# Patient Record
Sex: FEMALE | URBAN - METROPOLITAN AREA
[De-identification: names, ages, dates, MRNs, and addresses within clinical notes are randomized per-mention and may not be internally consistent; named-entity substitution may affect disease eponyms.]

---

## 2022-09-30 ENCOUNTER — APPOINTMENT (OUTPATIENT)
Dept: URBAN - METROPOLITAN AREA CLINIC 260 | Age: 62
Setting detail: DERMATOLOGY
End: 2022-10-01

## 2022-09-30 ENCOUNTER — RX ONLY (RX ONLY)
Age: 62
End: 2022-09-30

## 2022-09-30 VITALS — WEIGHT: 135 LBS | HEIGHT: 69 IN

## 2022-09-30 DIAGNOSIS — L60.8 OTHER NAIL DISORDERS: ICD-10-CM

## 2022-09-30 DIAGNOSIS — L20.89 OTHER ATOPIC DERMATITIS: ICD-10-CM

## 2022-09-30 PROBLEM — L20.84 INTRINSIC (ALLERGIC) ECZEMA: Status: ACTIVE | Noted: 2022-09-30

## 2022-09-30 PROCEDURE — 99203 OFFICE O/P NEW LOW 30 MIN: CPT

## 2022-09-30 PROCEDURE — OTHER COUNSELING: OTHER

## 2022-09-30 PROCEDURE — OTHER PRESCRIPTION: OTHER

## 2022-09-30 PROCEDURE — OTHER PHOTO-DOCUMENTATION: OTHER

## 2022-09-30 PROCEDURE — OTHER ADDITIONAL NOTES: OTHER

## 2022-09-30 PROCEDURE — OTHER MIPS QUALITY: OTHER

## 2022-09-30 PROCEDURE — OTHER PRESCRIPTION MEDICATION MANAGEMENT: OTHER

## 2022-09-30 RX ORDER — FLUOCINOLONE ACETONIDE 0.11 MG/ML
0.01% OIL AURICULAR (OTIC) BID
Qty: 20 | Refills: 6 | Status: ERX

## 2022-09-30 RX ORDER — CLOBETASOL PROPIONATE 0.5 MG/ML
0.05% SOLUTION TOPICAL BID
Qty: 50 | Refills: 6 | Status: ERX | COMMUNITY
Start: 2022-09-30

## 2022-09-30 RX ORDER — CLOBETASOL PROPIONATE 0.5 MG/ML
0.05% SOLUTION TOPICAL BID
Qty: 50 | Refills: 6 | Status: ERX

## 2022-09-30 RX ORDER — FLUOCINOLONE ACETONIDE 0.11 MG/ML
0.01% OIL AURICULAR (OTIC) BID
Qty: 20 | Refills: 6 | Status: ERX | COMMUNITY
Start: 2022-09-30

## 2022-09-30 ASSESSMENT — LOCATION DETAILED DESCRIPTION DERM
LOCATION DETAILED: LEFT SUPERIOR MEDIAL FOREHEAD
LOCATION DETAILED: LEFT SMALL FINGERNAIL
LOCATION DETAILED: LEFT RING FINGERNAIL
LOCATION DETAILED: RIGHT MEDIAL FRONTAL SCALP

## 2022-09-30 ASSESSMENT — LOCATION SIMPLE DESCRIPTION DERM
LOCATION SIMPLE: LEFT RING FINGERNAIL
LOCATION SIMPLE: LEFT FOREHEAD
LOCATION SIMPLE: RIGHT SCALP
LOCATION SIMPLE: LEFT SMALL FINGERNAIL

## 2022-09-30 ASSESSMENT — LOCATION ZONE DERM
LOCATION ZONE: SCALP
LOCATION ZONE: FINGERNAIL
LOCATION ZONE: FACE

## 2022-09-30 NOTE — PROCEDURE: PRESCRIPTION MEDICATION MANAGEMENT
Detail Level: Zone
Continue Regimen: clobetasol 0.05 % scalp solution BID\\nfluocinolone acetonide oil 0.01 % ear drops Bid
Plan: Recheck eczema as needed.\\nPt just needs refills today. These control her symptoms well
Render In Strict Bullet Format?: No

## 2022-09-30 NOTE — PROCEDURE: ADDITIONAL NOTES
Additional Notes: Advised patient to try over the counter tea tree oil applied to nails qd to prophylactically treat for her fungus however I do not see any present today. Photos taken.
Detail Level: Detailed
Render Risk Assessment In Note?: no

## 2023-01-17 ENCOUNTER — RX ONLY (RX ONLY)
Age: 63
End: 2023-01-17

## 2023-01-17 RX ORDER — BETAMETHASONE DIPROPIONATE 0.5 MG/ML
LOTION TOPICAL
Qty: 60 | Refills: 2 | Status: ERX | COMMUNITY
Start: 2023-01-17

## 2023-03-24 ENCOUNTER — RX ONLY (RX ONLY)
Age: 63
End: 2023-03-24

## 2023-03-24 RX ORDER — CLOBETASOL PROPIONATE 0.5 MG/ML
0.05% SOLUTION TOPICAL BID
Qty: 50 | Refills: 6 | Status: ERX

## 2023-11-29 ENCOUNTER — HOSPITAL ENCOUNTER (INPATIENT)
Facility: CLINIC | Age: 63
LOS: 3 days | Discharge: HOME | DRG: 641 | End: 2023-12-02
Attending: STUDENT IN AN ORGANIZED HEALTH CARE EDUCATION/TRAINING PROGRAM | Admitting: STUDENT IN AN ORGANIZED HEALTH CARE EDUCATION/TRAINING PROGRAM
Payer: COMMERCIAL

## 2023-11-29 DIAGNOSIS — E87.1 HYPONATREMIA: Primary | ICD-10-CM

## 2023-11-29 DIAGNOSIS — E83.51 HYPOCALCEMIA: ICD-10-CM

## 2023-11-29 LAB
ANION GAP SERPL CALCULATED.3IONS-SCNC: 10 MMOL/L (ref 7–15)
ANION GAP SERPL CALCULATED.3IONS-SCNC: 9 MMOL/L (ref 7–15)
BASOPHILS # BLD AUTO: 0 10E3/UL (ref 0–0.2)
BASOPHILS NFR BLD AUTO: 0 %
BUN SERPL-MCNC: 6.1 MG/DL (ref 8–23)
BUN SERPL-MCNC: 7.5 MG/DL (ref 8–23)
CALCIUM SERPL-MCNC: 8.6 MG/DL (ref 8.8–10.2)
CALCIUM SERPL-MCNC: 8.7 MG/DL (ref 8.8–10.2)
CHLORIDE SERPL-SCNC: 87 MMOL/L (ref 98–107)
CHLORIDE SERPL-SCNC: 89 MMOL/L (ref 98–107)
CREAT SERPL-MCNC: 0.74 MG/DL (ref 0.51–0.95)
CREAT SERPL-MCNC: 0.74 MG/DL (ref 0.51–0.95)
DEPRECATED HCO3 PLAS-SCNC: 22 MMOL/L (ref 22–29)
DEPRECATED HCO3 PLAS-SCNC: 23 MMOL/L (ref 22–29)
EGFRCR SERPLBLD CKD-EPI 2021: >90 ML/MIN/1.73M2
EGFRCR SERPLBLD CKD-EPI 2021: >90 ML/MIN/1.73M2
EOSINOPHIL # BLD AUTO: 0 10E3/UL (ref 0–0.7)
EOSINOPHIL NFR BLD AUTO: 0 %
ERYTHROCYTE [DISTWIDTH] IN BLOOD BY AUTOMATED COUNT: 12 % (ref 10–15)
GLUCOSE SERPL-MCNC: 125 MG/DL (ref 70–99)
GLUCOSE SERPL-MCNC: 133 MG/DL (ref 70–99)
HCT VFR BLD AUTO: 38.6 % (ref 35–47)
HGB BLD-MCNC: 13.5 G/DL (ref 11.7–15.7)
HOLD SPECIMEN: NORMAL
IMM GRANULOCYTES # BLD: 0 10E3/UL
IMM GRANULOCYTES NFR BLD: 0 %
LYMPHOCYTES # BLD AUTO: 0.9 10E3/UL (ref 0.8–5.3)
LYMPHOCYTES NFR BLD AUTO: 19 %
MAGNESIUM SERPL-MCNC: 1.8 MG/DL (ref 1.7–2.3)
MCH RBC QN AUTO: 31.1 PG (ref 26.5–33)
MCHC RBC AUTO-ENTMCNC: 35 G/DL (ref 31.5–36.5)
MCV RBC AUTO: 89 FL (ref 78–100)
MONOCYTES # BLD AUTO: 0.4 10E3/UL (ref 0–1.3)
MONOCYTES NFR BLD AUTO: 8 %
NEUTROPHILS # BLD AUTO: 3.6 10E3/UL (ref 1.6–8.3)
NEUTROPHILS NFR BLD AUTO: 73 %
NRBC # BLD AUTO: 0 10E3/UL
NRBC BLD AUTO-RTO: 0 /100
OSMOLALITY SERPL: 259 MMOL/KG (ref 280–301)
PLATELET # BLD AUTO: 310 10E3/UL (ref 150–450)
POTASSIUM SERPL-SCNC: 4.7 MMOL/L (ref 3.4–5.3)
POTASSIUM SERPL-SCNC: 4.8 MMOL/L (ref 3.4–5.3)
RBC # BLD AUTO: 4.34 10E6/UL (ref 3.8–5.2)
SODIUM SERPL-SCNC: 119 MMOL/L (ref 135–145)
SODIUM SERPL-SCNC: 121 MMOL/L (ref 135–145)
SODIUM SERPL-SCNC: 123 MMOL/L (ref 135–145)
SODIUM SERPL-SCNC: 127 MMOL/L (ref 135–145)
SODIUM SERPL-SCNC: 128 MMOL/L (ref 135–145)
SODIUM SERPL-SCNC: 128 MMOL/L (ref 135–145)
SODIUM UR-SCNC: 89 MMOL/L
WBC # BLD AUTO: 5 10E3/UL (ref 4–11)

## 2023-11-29 PROCEDURE — 84300 ASSAY OF URINE SODIUM: CPT | Performed by: STUDENT IN AN ORGANIZED HEALTH CARE EDUCATION/TRAINING PROGRAM

## 2023-11-29 PROCEDURE — 36415 COLL VENOUS BLD VENIPUNCTURE: CPT | Performed by: STUDENT IN AN ORGANIZED HEALTH CARE EDUCATION/TRAINING PROGRAM

## 2023-11-29 PROCEDURE — 250N000011 HC RX IP 250 OP 636: Performed by: STUDENT IN AN ORGANIZED HEALTH CARE EDUCATION/TRAINING PROGRAM

## 2023-11-29 PROCEDURE — 84295 ASSAY OF SERUM SODIUM: CPT | Performed by: STUDENT IN AN ORGANIZED HEALTH CARE EDUCATION/TRAINING PROGRAM

## 2023-11-29 PROCEDURE — 99223 1ST HOSP IP/OBS HIGH 75: CPT | Performed by: NURSE PRACTITIONER

## 2023-11-29 PROCEDURE — 83735 ASSAY OF MAGNESIUM: CPT | Performed by: STUDENT IN AN ORGANIZED HEALTH CARE EDUCATION/TRAINING PROGRAM

## 2023-11-29 PROCEDURE — 99285 EMERGENCY DEPT VISIT HI MDM: CPT | Mod: 25

## 2023-11-29 PROCEDURE — 120N000001 HC R&B MED SURG/OB

## 2023-11-29 PROCEDURE — 258N000003 HC RX IP 258 OP 636: Performed by: STUDENT IN AN ORGANIZED HEALTH CARE EDUCATION/TRAINING PROGRAM

## 2023-11-29 PROCEDURE — 258N000003 HC RX IP 258 OP 636: Performed by: HOSPITALIST

## 2023-11-29 PROCEDURE — 250N000011 HC RX IP 250 OP 636: Mod: JZ | Performed by: STUDENT IN AN ORGANIZED HEALTH CARE EDUCATION/TRAINING PROGRAM

## 2023-11-29 PROCEDURE — 85025 COMPLETE CBC W/AUTO DIFF WBC: CPT | Performed by: STUDENT IN AN ORGANIZED HEALTH CARE EDUCATION/TRAINING PROGRAM

## 2023-11-29 PROCEDURE — 96360 HYDRATION IV INFUSION INIT: CPT

## 2023-11-29 PROCEDURE — 80051 ELECTROLYTE PANEL: CPT | Performed by: STUDENT IN AN ORGANIZED HEALTH CARE EDUCATION/TRAINING PROGRAM

## 2023-11-29 PROCEDURE — 83930 ASSAY OF BLOOD OSMOLALITY: CPT | Performed by: STUDENT IN AN ORGANIZED HEALTH CARE EDUCATION/TRAINING PROGRAM

## 2023-11-29 RX ORDER — ONDANSETRON 2 MG/ML
4 INJECTION INTRAMUSCULAR; INTRAVENOUS
Status: COMPLETED | OUTPATIENT
Start: 2023-11-29 | End: 2023-11-29

## 2023-11-29 RX ORDER — LIDOCAINE 40 MG/G
CREAM TOPICAL
Status: DISCONTINUED | OUTPATIENT
Start: 2023-11-29 | End: 2023-12-02 | Stop reason: HOSPADM

## 2023-11-29 RX ORDER — ACETAMINOPHEN 650 MG/1
650 SUPPOSITORY RECTAL EVERY 4 HOURS PRN
Status: DISCONTINUED | OUTPATIENT
Start: 2023-11-29 | End: 2023-11-29

## 2023-11-29 RX ORDER — DEXTROSE MONOHYDRATE 50 MG/ML
INJECTION, SOLUTION INTRAVENOUS CONTINUOUS
Status: DISCONTINUED | OUTPATIENT
Start: 2023-11-29 | End: 2023-11-30

## 2023-11-29 RX ORDER — AMOXICILLIN 250 MG
2 CAPSULE ORAL 2 TIMES DAILY PRN
Status: DISCONTINUED | OUTPATIENT
Start: 2023-11-29 | End: 2023-12-02 | Stop reason: HOSPADM

## 2023-11-29 RX ORDER — IBUPROFEN 400 MG/1
400 TABLET, FILM COATED ORAL EVERY 6 HOURS PRN
Status: DISCONTINUED | OUTPATIENT
Start: 2023-11-29 | End: 2023-12-01

## 2023-11-29 RX ORDER — AMOXICILLIN 250 MG
1 CAPSULE ORAL 2 TIMES DAILY PRN
Status: DISCONTINUED | OUTPATIENT
Start: 2023-11-29 | End: 2023-12-02 | Stop reason: HOSPADM

## 2023-11-29 RX ORDER — SPIRONOLACTONE 50 MG/1
50 TABLET, FILM COATED ORAL DAILY
Status: ON HOLD | COMMUNITY
End: 2023-12-02

## 2023-11-29 RX ORDER — ACETAMINOPHEN 325 MG/1
650 TABLET ORAL EVERY 4 HOURS PRN
Status: DISCONTINUED | OUTPATIENT
Start: 2023-11-29 | End: 2023-11-29

## 2023-11-29 RX ORDER — CALCIUM CARBONATE 500 MG/1
1000 TABLET, CHEWABLE ORAL 4 TIMES DAILY PRN
Status: DISCONTINUED | OUTPATIENT
Start: 2023-11-29 | End: 2023-12-02 | Stop reason: HOSPADM

## 2023-11-29 RX ORDER — MINOXIDIL 2.5 MG/1
2.5 TABLET ORAL DAILY
Status: ON HOLD | COMMUNITY
End: 2023-12-02

## 2023-11-29 RX ORDER — SODIUM CHLORIDE 9 MG/ML
INJECTION, SOLUTION INTRAVENOUS CONTINUOUS
Status: DISCONTINUED | OUTPATIENT
Start: 2023-11-29 | End: 2023-12-01

## 2023-11-29 RX ADMIN — DEXTROSE MONOHYDRATE: 50 INJECTION, SOLUTION INTRAVENOUS at 23:02

## 2023-11-29 RX ADMIN — ONDANSETRON 4 MG: 2 INJECTION INTRAMUSCULAR; INTRAVENOUS at 08:45

## 2023-11-29 RX ADMIN — SODIUM CHLORIDE, POTASSIUM CHLORIDE, SODIUM LACTATE AND CALCIUM CHLORIDE 1000 ML: 600; 310; 30; 20 INJECTION, SOLUTION INTRAVENOUS at 06:50

## 2023-11-29 RX ADMIN — DEXTROSE MONOHYDRATE 500 ML: 50 INJECTION, SOLUTION INTRAVENOUS at 18:19

## 2023-11-29 RX ADMIN — SODIUM CHLORIDE: 9 INJECTION, SOLUTION INTRAVENOUS at 10:04

## 2023-11-29 ASSESSMENT — ACTIVITIES OF DAILY LIVING (ADL)
ADLS_ACUITY_SCORE: 22
ADLS_ACUITY_SCORE: 35
ADLS_ACUITY_SCORE: 22
ADLS_ACUITY_SCORE: 35
WEAR_GLASSES_OR_BLIND: YES
ADLS_ACUITY_SCORE: 22

## 2023-11-29 NOTE — ED TRIAGE NOTES
Pt states she Had covid on 11/21, took low dose paxlovid and felt like recovered nicely. Has been taking spironolactone since April for hair loss and recently started taking minoxadil as well which was added recently. Pt is aware that both of these are dieretics and feels that she is dehydrated and/or electrolytes are off despite trying to stay extra hydrated. Pt feels light headed, has brain fog, nausea, and like she may pass out. Pt endorses having diarrhea and lingering cough. Has hx of hypoglycemia, though has never been tested for it. VSS, AxOx4

## 2023-11-29 NOTE — ED PROVIDER NOTES
"  History     Chief Complaint:  Dehydration       HPI   Kylee Shin is a 63 year old female presents with concerns of electrolyte issues.  She states she has been having all the symptoms of having electrolyte issues and dehydration for the last few days including feeling lightheaded, brain fog and nausea.  Patient recently started spironolactone in April for hair loss.  Then last week she developed COVID symptoms and started Paxlovid on Wednesday.  She completed the course.  She has been drinking a lot of water up through last night.  Patient also endorses having diarrhea and a lingering cough.  No chest pain.  No shortness of breath.  No difficulties ambulating.      Independent Historian:    none    Review of External Notes:  Office visit November 24, 2023 for Paxlovid.    Allergies:  No Known Drug Allergy     Physical Exam   Patient Vitals for the past 24 hrs:   BP Temp Temp src Pulse Resp SpO2 Height Weight   11/29/23 0751 (!) 155/94 -- -- 92 -- 100 % -- --   11/29/23 0615 -- 97.9  F (36.6  C) Oral -- -- -- 1.753 m (5' 9\") 62.1 kg (137 lb)   11/29/23 0614 (!) 157/107 -- -- 88 16 100 % -- --        Physical Exam  GENERAL: Patient appears fatigued but nontoxic.  HEAD: Atraumatic.  NECK: No rigidity  CV: RRR, no murmurs rubs or gallops  PULM: CTAB with good aeration; no retractions, rales, rhonchi, or wheezing  ABD: Soft, nontender, nondistended, no guarding  DERM: No rash. Skin warm and dry  EXTREMITY: Moving all extremities without difficulty. No calf tenderness or peripheral edema      Emergency Department Course         Laboratory: Imaging:   Labs Ordered and Resulted from Time of ED Arrival to Time of ED Departure   BASIC METABOLIC PANEL - Abnormal       Result Value    Sodium 119 (*)     Potassium 4.7      Chloride 87 (*)     Carbon Dioxide (CO2) 22      Anion Gap 10      Urea Nitrogen 7.5 (*)     Creatinine 0.74      GFR Estimate >90      Calcium 8.7 (*)     Glucose 133 (*)    MAGNESIUM - Normal    " Magnesium 1.8     CBC WITH PLATELETS AND DIFFERENTIAL    WBC Count 5.0      RBC Count 4.34      Hemoglobin 13.5      Hematocrit 38.6      MCV 89      MCH 31.1      MCHC 35.0      RDW 12.0      Platelet Count 310      % Neutrophils 73      % Lymphocytes 19      % Monocytes 8      % Eosinophils 0      % Basophils 0      % Immature Granulocytes 0      NRBCs per 100 WBC 0      Absolute Neutrophils 3.6      Absolute Lymphocytes 0.9      Absolute Monocytes 0.4      Absolute Eosinophils 0.0      Absolute Basophils 0.0      Absolute Immature Granulocytes 0.0      Absolute NRBCs 0.0     BASIC METABOLIC PANEL     No orders to display                Emergency Department Course & Assessments:             Interventions:  Medications   ondansetron (ZOFRAN) injection 4 mg (has no administration in time range)   lactated ringers BOLUS 1,000 mL (0 mLs Intravenous Stopped 11/29/23 0418)        Assessments, Independent Interpretation, Consult/Discussion of ManagementTests:   Discussed with hospitalist BETTY Winchester under Dr. Birch    Social Determinants of Health affecting care:  None    Disposition:  The patient was admitted to the hospital under the care of Dr. Birch.     Impression & Plan         Medical Decision Making:  Symptoms consistent with hyponatremia  Chronic conditions complicating -hair loss, on spironolactone.  DDx considered hypervolemic vs hypovolemic vs euvolemic causes.  Patient was initially given a liter of fluids upfront as she felt dehydrated.  We will give no further IV fluids and will p.o. restrict.  Labs significant for hyponatremia  Patient is answering all questions appropriately.  She does not appear confused.  No seizure.  Consider hypertonic saline -however not emergently indicated.  PO restricted patient  Discussed with hospitalist team  Patient admitted.   Repeat sodium pending upon admission.      Diagnosis:    ICD-10-CM    1. Hyponatremia  E87.1       2. Hypocalcemia  E83.51            Discharge  Medications:  New Prescriptions    No medications on file          11/29/2023   Jason Beck MD Foss, Kevin, MD  11/29/23 0844

## 2023-11-29 NOTE — PHARMACY-ADMISSION MEDICATION HISTORY
Pharmacist Admission Medication History    Admission medication history is complete. The information provided in this note is only as accurate as the sources available at the time of the update.    Information Source(s): Patient via in-person    Pertinent Information: Completed a 5 days course of Paxlovid, last dose taken yesterday at 9am. Takes THC gummies for sleep sometimes. Omeprazole - does not take on regular basis, about two pills per month.    Changes made to PTA medication list:  Added: all  Deleted: augmentin (old script, does not take)  Changed: None    Medication Affordability: no     Allergies reviewed with patient and updates made in EHR: yes (nka)    Prior to Admission medications    Medication Sig Last Dose Taking?   minoxidil (LONITEN) 2.5 MG tablet Take 2.5 mg by mouth daily 11/28/2023 Yes   omeprazole (PRILOSEC) 20 MG DR capsule Take 20 mg by mouth daily as needed (reflux) prn Yes   spironolactone (ALDACTONE) 50 MG tablet Take 50 mg by mouth daily 11/28/2023 Yes        100

## 2023-11-29 NOTE — H&P
[facility]  Admission History and Physical Examination    NAME: Kylee Draper   : 1960   MRN: [id number]     Date of Admission:  2023    Assessment & Plan   Kylee Draper is a 63 year old female who presents with confusion, weakness, dizziness due to acute hyponataremia and recent COVID infection.     Acute Symptomatic Hyponatremia  Confusion  Nausea  Sodium is 119, no prior episodes of hyponatremia so this is acute. Presents with 1 week of worsening dizziness, weakness, diarrhea and now confusion. Did have COVID within the past week, was treated with Paxlovid. BMP shows acute hyponatremia. Is on Spirolactone so this combine with recent COVID infection most likely is cause of hyponatremia.   - Q4 Sodium  - Q4 Neuro checks  - Goal is to have sodium  increase 6-8 today, avoid going past 127 today.  If goes higher than 127, need to start D5.   - Gentle IV with normal saline  - hold diuretic  - urine studies, Osmolality   - PRN nausea    Recent COVID infection  - Tested positive on , symptoms started on , should be out of window for precautions  - Treated with Paxlovid at home  - no O2 needs    Weakness  - PT/OT    Hair loss  - Was taking Spirolactone, currently will hold due to dehydration    Insomnia  - Hold Modafinil      Awaiting formal pharmacy reconciliation to resume home medications.     DVT Prophylaxis: Low Risk/Ambulatory with no VTE prophylaxis indicated  Code Status: Full Code  COVID PCR TESTING STATUS: NA, tested positive for COVID on   Family updated with plan of care: Family at bedside       Expected Discharge Date: 2023               HERB Waters CNP    Primary Care Physician   Physician No Ref-Primary    Chief Complaint   Weakness, Confusion, nasuea, Diarrhea    History is obtained from the patient    Discussed with Dr. Beck in the ED, full chart review including lab work, imaging, and vital signs were reviewed. Patient received 1L IV fluids  in the ED, recheck in [facility] is pending      History of Present Illness   Kylee Draper is a 63 year old female who presents with insomnia and hair loss on Spirolactone who presented to ED with with 1 weak of weakness, dizziness, confusion, and diarrhea. Per patient, she developed COVID symptoms and within 1-2 days, she test positive for COVID on 11/21. She was not improving so she was prescribed Paxlovid. Initially she could not tolerate due to GI side effects so she cut dose in half to complete course. Over this time, she has felt continuous weakness, intermittent lightheadedness, dizziness, and malaise. Yesterday she started to notice her PO intake was poor and she developed confusion so she presented to the ED for evaluation.  In the ED, she was found to have sodium of 119 and was given 1L of IV fluids. Currently is feeling nauseated, was given Zofran. She was also noted to start Modafinil within the past 3 weeks for Insomnia.       Past Medical History    I have reviewed this patient's medical history and updated it with pertinent information if needed.   Past Medical History:   Diagnosis Date    Basal cell carcinoma     Diverticulosis of large intestine     Squamous cell carcinoma        Past Surgical History   I have reviewed this patient's surgical history and updated it with pertinent information if needed.  Past Surgical History:   Procedure Laterality Date    BIOPSY OF SKIN LESION      CHOLECYSTECTOMY      COLONOSCOPY      MOHS MICROGRAPHIC PROCEDURE         Prior to Admission Medications   Prior to Admission Medications   Prescriptions Last Dose Informant Patient Reported? Taking?   amoxicillin-clavulanate (AUGMENTIN) 875-125 MG per tablet   Yes No   Sig: Take 1 tablet by mouth 2 times daily      Facility-Administered Medications: None     Allergies   Allergies   Allergen Reactions    No Known Drug Allergy        Social History   I have reviewed this patient's social history and updated it with pertinent  information if needed. Kylee Draper  reports that she has never smoked. She has never used smokeless tobacco.    Family History   I have reviewed this patient's family history and updated it with pertinent information if needed.   Family History   Problem Relation Age of Onset    Cancer No family hx of         No known family hx of skin cancer       Review of Systems   The 10 point Review of Systems is negative other than noted in the HPI or here.     Physical Exam   Temp: 97.9  F (36.6  C) Temp src: Oral BP: (!) 155/94 Pulse: 92   Resp: 16 SpO2: 100 %      Vital Signs with Ranges  Temp:  [97.9  F (36.6  C)] 97.9  F (36.6  C)  Pulse:  [88-92] 92  Resp:  [16] 16  BP: (155-157)/() 155/94  SpO2:  [100 %] 100 %  137 lbs 0 oz    Constitutional: Awake, alert,  feels unwell, weak  Eyes: Conjunctiva and pupils examined and normal.  HEENT: Dry mucous membranes, normal dentition.  Respiratory: Clear to auscultation bilaterally, no crackles or wheezing. No respiratory distress  Cardiovascular: Regular rate and rhythm, normal S1 and S2, and no murmur noted. No edema  GI: Soft, non-distended, non-tender, bowel sounds present. No rebound tenderness or guarding.  Lymph/Hematologic: No anterior cervical or supraclavicular adenopathy.  Skin: No rashes, no cyanosis, no edema.  Musculoskeletal: No deformities noted.  No erythema or tenderness. Moving all extremities.  Neurologic: No focal deficits noted. Speech is clear. Coordination and strength grossly normal.  Alert and oriented.   Psychiatric: Appropriate affect.    Data   Data reviewed today:      EKG: None  Imaging: No results found for this or any previous visit (from the past 24 hour(s)).    Recent Labs   Lab 11/29/23  0648   WBC 5.0   HGB 13.5   MCV 89      *   POTASSIUM 4.7   CHLORIDE 87*   CO2 22   BUN 7.5*   CR 0.74   ANIONGAP 10   JORJE 8.7*   *           HERB Waters [facility] Medicine  November 29, 2023  Securely message with  the [employer] Web Console (learn more here)  Text page via [facility] Paging/Directory

## 2023-11-29 NOTE — PLAN OF CARE
"Goal Outcome Evaluation:      Plan of Care Reviewed With: patient    Overall Patient Progress: improvingOverall Patient Progress: improving    Outcome Evaluation: .    A&Ox4  VSS on RA  Complains of a headache, waiting for orders  Activity: SBA   Consults: PT/OT  Monitoring sodium level. Notify if greater than 127      Problem: Adult Inpatient Plan of Care  Goal: Plan of Care Review  Description: The Plan of Care Review/Shift note should be completed every shift.  The Outcome Evaluation is a brief statement about your assessment that the patient is improving, declining, or no change.  This information will be displayed automatically on your shift  note.  Outcome: Progressing  Flowsheets (Taken 11/29/2023 1510)  Outcome Evaluation: .  Plan of Care Reviewed With: patient  Overall Patient Progress: improving  Goal: Patient-Specific Goal (Individualized)  Description: You can add care plan individualizations to a care plan. Examples of Individualization might be:  \"Parent requests to be called daily at 9am for status\", \"I have a hard time hearing out of my right ear\", or \"Do not touch me to wake me up as it startles  me\".  Outcome: Progressing  Goal: Absence of Hospital-Acquired Illness or Injury  Outcome: Progressing  Intervention: Identify and Manage Fall Risk  Recent Flowsheet Documentation  Taken 11/29/2023 1443 by Dipika Rose RN  Safety Promotion/Fall Prevention:   activity supervised   assistive device/personal items within reach   clutter free environment maintained   increased rounding and observation   increase visualization of patient   lighting adjusted   nonskid shoes/slippers when out of bed   patient and family education   room door open   room near nurse's station   room organization consistent   safety round/check completed   supervised activity   treat underlying cause  Intervention: Prevent Skin Injury  Recent Flowsheet Documentation  Taken 11/29/2023 1443 by Dipika Rose, RN  Body " Position:   position changed independently   supine  Intervention: Prevent and Manage VTE (Venous Thromboembolism) Risk  Recent Flowsheet Documentation  Taken 11/29/2023 1443 by Dipika Rose, RN  VTE Prevention/Management: SCDs (sequential compression devices) off  Intervention: Prevent Infection  Recent Flowsheet Documentation  Taken 11/29/2023 1443 by Dipika Rose, RN  Infection Prevention:   equipment surfaces disinfected   hand hygiene promoted  Goal: Optimal Comfort and Wellbeing  Outcome: Progressing  Goal: Readiness for Transition of Care  Outcome: Progressing  Intervention: Mutually Develop Transition Plan  Recent Flowsheet Documentation  Taken 11/29/2023 1443 by Dipika Rose, RN  Equipment Currently Used at Home: none

## 2023-11-29 NOTE — PROGRESS NOTES
Initial sodium on presentation was 119.  Started on maintenance IV fluids with NS.  Subsequent every 4 sodiums were 121, then 123, then 128.  Ideally peak sodium over first 24 hours would have been 127, so administered 500 cc bolus of D5W.  Discussed with cross cover physician, will follow-up next NA check and administer additional D5W if necessary if sodium continuing to rise.    Plan  - 500 cc bolus D5W now  - Follow-up 4 hours sodium check, goal sodium 127 by tomorrow morning      Anthony Birch MD

## 2023-11-29 NOTE — ED NOTES
"[facility]  ED Nurse Handoff Report    ED Chief complaint: Dehydration  . ED Diagnosis:   Final diagnoses:   Hyponatremia   Hypocalcemia       Allergies:   Allergies   Allergen Reactions    No Known Drug Allergy        Code Status: Full Code    Activity level - Baseline/Home:  independent.  Activity Level - Current:   independent.   Lift room needed: No.   Bariatric: No   Needed: No   Isolation: No.   Infection: Not Applicable.     Respiratory status: Room air    Vital Signs (within 30 minutes):   Vitals:    11/29/23 0614 11/29/23 0615 11/29/23 0751   BP: (!) 157/107  (!) 155/94   Pulse: 88  92   Resp: 16     Temp:  97.9  F (36.6  C)    TempSrc:  Oral    SpO2: 100%  100%   Weight:  62.1 kg (137 lb)    Height:  1.753 m (5' 9\")        Cardiac Rhythm:  ,      Pain level:    Patient confused: No.   Patient Falls Risk: nonskid shoes/slippers when out of bed.   Elimination Status: Has voided     Patient Report - Initial Complaint: Dehydration  Focused Assessment: Kylee Shin is a 63 year old female presents with concerns of electrolyte issues.  She states she has been having all the symptoms of having electrolyte issues and dehydration for the last few days including feeling lightheaded, brain fog and nausea.  Patient recently started spironolactone in April for hair loss.  Then last week she developed COVID symptoms and started Paxlovid on Wednesday.  She completed the course.  She has been drinking a lot of water up through last night.  Patient also endorses having diarrhea and a lingering cough.  No chest pain.  No shortness of breath.  No difficulties ambulating     Abnormal Results:   Labs Ordered and Resulted from Time of ED Arrival to Time of ED Departure   BASIC METABOLIC PANEL - Abnormal       Result Value    Sodium 119 (*)     Potassium 4.7      Chloride 87 (*)     Carbon Dioxide (CO2) 22      Anion Gap 10      Urea Nitrogen 7.5 (*)     Creatinine 0.74      GFR Estimate " >90      Calcium 8.7 (*)     Glucose 133 (*)    MAGNESIUM - Normal    Magnesium 1.8     CBC WITH PLATELETS AND DIFFERENTIAL    WBC Count 5.0      RBC Count 4.34      Hemoglobin 13.5      Hematocrit 38.6      MCV 89      MCH 31.1      MCHC 35.0      RDW 12.0      Platelet Count 310      % Neutrophils 73      % Lymphocytes 19      % Monocytes 8      % Eosinophils 0      % Basophils 0      % Immature Granulocytes 0      NRBCs per 100 WBC 0      Absolute Neutrophils 3.6      Absolute Lymphocytes 0.9      Absolute Monocytes 0.4      Absolute Eosinophils 0.0      Absolute Basophils 0.0      Absolute Immature Granulocytes 0.0      Absolute NRBCs 0.0          No orders to display       Treatments provided: IVF  Family Comments: Friend at bedside.   OBS brochure/video discussed/provided to patient:  N/A  ED Medications:   Medications   lactated ringers BOLUS 1,000 mL (0 mLs Intravenous Stopped 11/29/23 0751)       Drips infusing:  No  For the majority of the shift this patient was Green.   Interventions performed were N/A.    Sepsis treatment initiated: No    Cares/treatment/interventions/medications to be completed following ED care: Monitor NA levels. Anti-emetics.     ED Nurse Name: Tere Lares RN  8:00 AM   RECEIVING UNIT ED HANDOFF REVIEW    Above ED Nurse Handoff Report was reviewed: Yes  Reviewed by: Dipika Rose RN on November 29, 2023 at 2:06 PM

## 2023-11-30 ENCOUNTER — APPOINTMENT (OUTPATIENT)
Dept: PHYSICAL THERAPY | Facility: CLINIC | Age: 63
DRG: 641 | End: 2023-11-30
Attending: NURSE PRACTITIONER
Payer: COMMERCIAL

## 2023-11-30 LAB
ALBUMIN UR-MCNC: NEGATIVE MG/DL
ANION GAP SERPL CALCULATED.3IONS-SCNC: 10 MMOL/L (ref 7–15)
APPEARANCE UR: CLEAR
BASOPHILS # BLD AUTO: 0 10E3/UL (ref 0–0.2)
BASOPHILS NFR BLD AUTO: 1 %
BILIRUB UR QL STRIP: NEGATIVE
BUN SERPL-MCNC: 7.8 MG/DL (ref 8–23)
CALCIUM SERPL-MCNC: 8.3 MG/DL (ref 8.8–10.2)
CHLORIDE SERPL-SCNC: 92 MMOL/L (ref 98–107)
COLOR UR AUTO: NORMAL
CREAT SERPL-MCNC: 0.79 MG/DL (ref 0.51–0.95)
DEPRECATED HCO3 PLAS-SCNC: 24 MMOL/L (ref 22–29)
EGFRCR SERPLBLD CKD-EPI 2021: 84 ML/MIN/1.73M2
EOSINOPHIL # BLD AUTO: 0.1 10E3/UL (ref 0–0.7)
EOSINOPHIL NFR BLD AUTO: 2 %
ERYTHROCYTE [DISTWIDTH] IN BLOOD BY AUTOMATED COUNT: 12.3 % (ref 10–15)
GLUCOSE SERPL-MCNC: 107 MG/DL (ref 70–99)
GLUCOSE UR STRIP-MCNC: NEGATIVE MG/DL
HCT VFR BLD AUTO: 37.1 % (ref 35–47)
HGB BLD-MCNC: 12.6 G/DL (ref 11.7–15.7)
HGB UR QL STRIP: NEGATIVE
IMM GRANULOCYTES # BLD: 0 10E3/UL
IMM GRANULOCYTES NFR BLD: 1 %
KETONES UR STRIP-MCNC: NEGATIVE MG/DL
LEUKOCYTE ESTERASE UR QL STRIP: NEGATIVE
LYMPHOCYTES # BLD AUTO: 1.5 10E3/UL (ref 0.8–5.3)
LYMPHOCYTES NFR BLD AUTO: 37 %
MAGNESIUM SERPL-MCNC: 2 MG/DL (ref 1.7–2.3)
MCH RBC QN AUTO: 31 PG (ref 26.5–33)
MCHC RBC AUTO-ENTMCNC: 34 G/DL (ref 31.5–36.5)
MCV RBC AUTO: 91 FL (ref 78–100)
MONOCYTES # BLD AUTO: 0.5 10E3/UL (ref 0–1.3)
MONOCYTES NFR BLD AUTO: 13 %
NEUTROPHILS # BLD AUTO: 1.9 10E3/UL (ref 1.6–8.3)
NEUTROPHILS NFR BLD AUTO: 46 %
NITRATE UR QL: NEGATIVE
OSMOLALITY UR: 186 MMOL/KG (ref 100–1200)
PH UR STRIP: 7 [PH] (ref 5–7)
PLATELET # BLD AUTO: 293 10E3/UL (ref 150–450)
POTASSIUM SERPL-SCNC: 4 MMOL/L (ref 3.4–5.3)
RBC # BLD AUTO: 4.07 10E6/UL (ref 3.8–5.2)
RBC URINE: <1 /HPF
SODIUM SERPL-SCNC: 126 MMOL/L (ref 135–145)
SODIUM SERPL-SCNC: 127 MMOL/L (ref 135–145)
SODIUM SERPL-SCNC: 127 MMOL/L (ref 135–145)
SODIUM UR-SCNC: 56 MMOL/L
SP GR UR STRIP: 1 (ref 1–1.03)
TSH SERPL DL<=0.005 MIU/L-ACNC: 1.49 UIU/ML (ref 0.3–4.2)
UROBILINOGEN UR STRIP-MCNC: NORMAL MG/DL
WBC # BLD AUTO: 3.9 10E3/UL (ref 4–11)
WBC # BLD AUTO: 3.9 10E3/UL (ref 4–11)
WBC URINE: <1 /HPF

## 2023-11-30 PROCEDURE — 84295 ASSAY OF SERUM SODIUM: CPT | Performed by: STUDENT IN AN ORGANIZED HEALTH CARE EDUCATION/TRAINING PROGRAM

## 2023-11-30 PROCEDURE — 84443 ASSAY THYROID STIM HORMONE: CPT | Performed by: STUDENT IN AN ORGANIZED HEALTH CARE EDUCATION/TRAINING PROGRAM

## 2023-11-30 PROCEDURE — 83735 ASSAY OF MAGNESIUM: CPT | Performed by: STUDENT IN AN ORGANIZED HEALTH CARE EDUCATION/TRAINING PROGRAM

## 2023-11-30 PROCEDURE — 84300 ASSAY OF URINE SODIUM: CPT | Performed by: STUDENT IN AN ORGANIZED HEALTH CARE EDUCATION/TRAINING PROGRAM

## 2023-11-30 PROCEDURE — 999N000111 HC STATISTIC OT IP EVAL DEFER

## 2023-11-30 PROCEDURE — 99233 SBSQ HOSP IP/OBS HIGH 50: CPT | Performed by: STUDENT IN AN ORGANIZED HEALTH CARE EDUCATION/TRAINING PROGRAM

## 2023-11-30 PROCEDURE — 36415 COLL VENOUS BLD VENIPUNCTURE: CPT | Performed by: STUDENT IN AN ORGANIZED HEALTH CARE EDUCATION/TRAINING PROGRAM

## 2023-11-30 PROCEDURE — 85027 COMPLETE CBC AUTOMATED: CPT | Performed by: STUDENT IN AN ORGANIZED HEALTH CARE EDUCATION/TRAINING PROGRAM

## 2023-11-30 PROCEDURE — 83935 ASSAY OF URINE OSMOLALITY: CPT | Performed by: STUDENT IN AN ORGANIZED HEALTH CARE EDUCATION/TRAINING PROGRAM

## 2023-11-30 PROCEDURE — 258N000003 HC RX IP 258 OP 636: Performed by: HOSPITALIST

## 2023-11-30 PROCEDURE — 250N000013 HC RX MED GY IP 250 OP 250 PS 637: Performed by: STUDENT IN AN ORGANIZED HEALTH CARE EDUCATION/TRAINING PROGRAM

## 2023-11-30 PROCEDURE — 97161 PT EVAL LOW COMPLEX 20 MIN: CPT | Mod: GP | Performed by: PHYSICAL THERAPIST

## 2023-11-30 PROCEDURE — 99418 PROLNG IP/OBS E/M EA 15 MIN: CPT | Performed by: STUDENT IN AN ORGANIZED HEALTH CARE EDUCATION/TRAINING PROGRAM

## 2023-11-30 PROCEDURE — 120N000001 HC R&B MED SURG/OB

## 2023-11-30 PROCEDURE — 81001 URINALYSIS AUTO W/SCOPE: CPT | Performed by: STUDENT IN AN ORGANIZED HEALTH CARE EDUCATION/TRAINING PROGRAM

## 2023-11-30 PROCEDURE — 97530 THERAPEUTIC ACTIVITIES: CPT | Mod: GP | Performed by: PHYSICAL THERAPIST

## 2023-11-30 RX ORDER — GUAIFENESIN 200 MG/10ML
200 LIQUID ORAL EVERY 4 HOURS PRN
Status: DISCONTINUED | OUTPATIENT
Start: 2023-11-30 | End: 2023-12-02 | Stop reason: HOSPADM

## 2023-11-30 RX ADMIN — DEXTROSE MONOHYDRATE: 50 INJECTION, SOLUTION INTRAVENOUS at 05:29

## 2023-11-30 RX ADMIN — GUAIFENESIN 200 MG: 200 SOLUTION ORAL at 21:18

## 2023-11-30 ASSESSMENT — ACTIVITIES OF DAILY LIVING (ADL)
ADLS_ACUITY_SCORE: 22

## 2023-11-30 NOTE — PLAN OF CARE
Goal Outcome Evaluation:    A&Ox4. Q4 neuro checks. VSS on RA. Denies pain. Up SBA. Monitoring sodium level, with goal of 127.     2249: Sodium 128, MD paged--- NS stopped. D5 75ml/hr started.      Plan of Care Reviewed With: patient    Overall Patient Progress: improving

## 2023-11-30 NOTE — PLAN OF CARE
"Goal Outcome Evaluation:      Plan of Care Reviewed With: patient    Overall Patient Progress: improvingOverall Patient Progress: improving    Outcome Evaluation: .    A&Ox4  VSS on RA  Denies pain  Activity: Ind  Protocols: Mg and K+, rechecks in am. Monitor sodium  Consults: PT  Plan to discharge when sodium levels return to normal      Problem: Adult Inpatient Plan of Care  Goal: Plan of Care Review  Description: The Plan of Care Review/Shift note should be completed every shift.  The Outcome Evaluation is a brief statement about your assessment that the patient is improving, declining, or no change.  This information will be displayed automatically on your shift  note.  Outcome: Progressing  Flowsheets (Taken 11/30/2023 1520)  Outcome Evaluation: .  Plan of Care Reviewed With: patient  Overall Patient Progress: improving  Goal: Patient-Specific Goal (Individualized)  Description: You can add care plan individualizations to a care plan. Examples of Individualization might be:  \"Parent requests to be called daily at 9am for status\", \"I have a hard time hearing out of my right ear\", or \"Do not touch me to wake me up as it startles  me\".  Outcome: Progressing  Goal: Absence of Hospital-Acquired Illness or Injury  Outcome: Progressing  Intervention: Identify and Manage Fall Risk  Recent Flowsheet Documentation  Taken 11/30/2023 1006 by Dipika Rose, RN  Safety Promotion/Fall Prevention: safety round/check completed  Taken 11/30/2023 0753 by Dipika Rose, RN  Safety Promotion/Fall Prevention:   activity supervised   assistive device/personal items within reach   clutter free environment maintained   increased rounding and observation   increase visualization of patient   lighting adjusted   nonskid shoes/slippers when out of bed   patient and family education   room door open   room near nurse's station   room organization consistent   safety round/check completed   supervised activity   treat underlying " cause  Taken 11/30/2023 0705 by Dipika Rose, RN  Safety Promotion/Fall Prevention: safety round/check completed  Intervention: Prevent Skin Injury  Recent Flowsheet Documentation  Taken 11/30/2023 0753 by Dipika Rose RN  Body Position:   position changed independently   weight shifting  Intervention: Prevent and Manage VTE (Venous Thromboembolism) Risk  Recent Flowsheet Documentation  Taken 11/30/2023 0753 by Dipika Rose, RN  VTE Prevention/Management: SCDs (sequential compression devices) off  Intervention: Prevent Infection  Recent Flowsheet Documentation  Taken 11/30/2023 0753 by Dipika Rose, RN  Infection Prevention:   equipment surfaces disinfected   hand hygiene promoted  Goal: Optimal Comfort and Wellbeing  Outcome: Progressing  Goal: Readiness for Transition of Care  Outcome: Progressing     Problem: Electrolyte Imbalance  Goal: Electrolyte Balance  Outcome: Progressing

## 2023-11-30 NOTE — PROGRESS NOTES
11/30/23 0900   Signing Clinician's Name / Credentials   Signing clinician's name / credentials Ana Toth DPT   Dynamic Gait Index (Benny and Giordano Shiro, 1995)   Gait Level Surface 3   Change in Gait Speed 3   Gait and Horizontal Head Turns 2   Gait with Vertical Head Turns 2       4-Item Dynamic Gait Index: Is a measure of gait responses to changing task demands in people with balance and vestibular disorders. (gait on level, w/ lateral & vertical head turns, and w/ change of speed)    Gait assistive device used: NONE     Patient score: 10/12  Scores <9/12 are correlated with increased risk of falling according to Ifeanyi & Sallie 2006.     Assessment (rationale for performing, application to patient s function & care plan): Pt is scoring above a falls risk, but would recommend OP to improve to perfect score as endorses no balance issues before having COVID and sodium imbalance. Pt agreeable to recs.   Minutes billed as physical performance test: none part of eval

## 2023-11-30 NOTE — PROGRESS NOTES
"SPIRITUAL HEALTH SERVICES - Progress Note  [facility] Med/Surg Unit 3    Saw pt Kylee Draper per admission request. Pt's daughter was also present.    Patient/Family Understanding of Illness and Goals of Care -   Pt accredited her hospitalization to her low sodium and recent COVID-19 diagnosis.   Pt hopes to go home today, but \"I'll stay here until I am better. They told me I was in critical condition when I arrived and that was very scary.\"    Distress and Loss -   Pt named that her brother is in hospice.   Pt recalled that \"being critical condition was scary and I had never been in that place before.\"    Strengths, Coping, and Resources -   Pt has a strong support network of family and friends.     Meaning, Beliefs, and Spirituality -   Pt shared that \"I am trusting in God this whole time. 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   Pt appreciated prayers for \"gratitude and rejoicing in being better.\"    Plan of Care -   Informed pt on requesting additional  support before discharge. [facility] remains available.     CORNELIA Lemon.   Intern    [facility] routine referrals *[phone]   [facility] available 24/7 for emergent requests/referrals, either by paging the on-call  or by entering an ASAP/STAT consult in Epic (this will also page the on-call ).    "

## 2023-11-30 NOTE — PROGRESS NOTES
XC    Na still risking to 128. Hold NS, start D5 75 ml/hr. Continue frequent Na checks.    Ciro Botello, DO

## 2023-11-30 NOTE — PLAN OF CARE
Goal Outcome Evaluation:       VS stable. No complaints of pain. Neuro's stable and intact. Slept well throughout the night.

## 2023-11-30 NOTE — PROGRESS NOTES
"[facility]    Medicine Progress Note - Hospitalist Service    Date of Admission:  11/29/2023    Assessment & Plan   63-year-old female with little past medical history presented to the ED with confusion, weakness, dizziness.  Had a recent COVID infection that was being treated with Paxlovid.  In the ED found to be hyponatremic to 119.  Has been receiving NS for maintenance fluids, encouraging p.o. intake, occasional boluses of D5W to avoid overcorrection of sodium in first 24 hours.     Latest Reference Range & Units 11/29/23 06:48 11/29/23 09:23 11/29/23 13:11 11/29/23 16:47 11/29/23 17:57 11/29/23 21:57 11/30/23 02:10 11/30/23 07:24   Sodium 135 - 145 mmol/L 119 (LL) 121 (L) 123 (L) 128 (L) 127 (L) 128 (L) 126 (L) 126 (L)     Acute vs subacute hyponatremia  Symptomatic hyponatremia with confusion, weakness  Initial sodium on arrival 119, symptomatic with weakness, confusion, fatigue, \"wobbly\" gait.  No previous episodes of hyponatremia that I can see in the chart; only recent illness is COVID 11/23 where she received Paxlovid.  Additionally per report from her and daughter, does not seem consistent with decreased oral intake.  Initially labs with serum osmolality 259 and urine sodium 123 received isotonic saline with improvement of sodium, repeat urine studies after isotonic saline with urine sodium and urine osm elevated (56, 186), not consistent with hypovolemic hyponatremia.  Will evaluate further with TSH, a.m. cortisol (also has concerns that she is hypoglycemic intermittently), and then consider other causes including SIADH, nephrogenic SIADH, etc.  Do note that she is on spironolactone and wonder if this actually is a mixed picture of hypovolemic hyponatremia with urine sodium reflective of spironolactone use.  We have since stopped giving IV fluids and patient has been taking adequate p.o. intake, interestingly her sodium has remained flat at 127.    --Will continue to monitor and if " continues to to remain flat or further decreased, will resume isotonic fluids and consider nephrology consult.    - q 4 hours sodium checks, neurochecks  - Sodium improved to first 24 hour goal 126, goal now normal serum sodium  - PT/OT assessments if ongoing weakness    Recent COVID infection  Tested positive on 11/21, symptoms started on 11/19.  Treated with course of Paxlovid at home.  No respiratory issues on arrival, not needing any supplemental oxygen.  Could be contributing to patient's hyponatremia, feeling rundown and ill recently could have reduced p.o. intake and allowed for hypovolemic hyponatremia to develop as well vs illness induced SIADH    Hair loss  Was taking spironolactone for this PTA.  Held on admission due to hypovolemia and hyponatremia.    Insomnia  PTA taking modafinil.  Holding for now.          Diet: Combination Diet Regular Diet Adult    DVT Prophylaxis: Pneumatic Compression Devices  Diehl Catheter: Not present  Lines: None     Cardiac Monitoring: None  Code Status: Full Code      Clinically Significant Risk Factors Present on Admission         # Hyponatremia: Lowest Na = 119 mmol/L in last 2 days, will monitor as appropriate          # Hypertension: Home medication list includes antihypertensive(s)                 Disposition Plan     Expected Discharge Date: 11/30/2023                    Shabnam Raymundo MD  Hospitalist Service  [facility]  Securely message with [employer] (more info)  Text page via [employer] Paging/Directory   ______________________________________________________________________    Interval History   Overnight felt mental status improved but still felt wobbly on her feet    Physical Exam   Vital Signs: Temp: 98.7  F (37.1  C) Temp src: Oral BP: 124/73 Pulse: 78   Resp: 16 SpO2: 98 % O2 Device: None (Room air)    Weight: 137 lbs 0 oz    Constitutional: Awake, alert, no acute distress  Respiratory: No respiratory distress  Cardiovascular: Regular rate  and rhythm  Skin: No rashes, no cyanosis, no edema.  Musculoskeletal: No deformities noted.  No erythema or tenderness. Moving all extremities.  Neurologic: No focal deficits noted. Speech is clear.   Psychiatric: Appropriate affect.    Medical Decision Making       65 MINUTES SPENT BY ME on the date of service doing chart review, history, exam, documentation & further activities per the note.        Data     I have personally reviewed the following data over the past 24 hrs:    N/A  \   N/A   / N/A     126 (L) 89 (L) 6.1 (L) /  125 (H)   4.8 23 0.74 \         Lab Results   Component Value Date     (L) 11/30/2023     (L) 11/30/2023     (L) 11/29/2023     (L) 11/29/2023     (L) 11/29/2023     (L) 11/29/2023     (L) 11/29/2023     (LL) 11/29/2023       Results for orders placed or performed during the hospital encounter of 11/29/23 (from the past 24 hour(s))   Basic metabolic panel (BMP)   Result Value Ref Range    Sodium 121 (L) 135 - 145 mmol/L    Potassium 4.8 3.4 - 5.3 mmol/L    Chloride 89 (L) 98 - 107 mmol/L    Carbon Dioxide (CO2) 23 22 - 29 mmol/L    Anion Gap 9 7 - 15 mmol/L    Urea Nitrogen 6.1 (L) 8.0 - 23.0 mg/dL    Creatinine 0.74 0.51 - 0.95 mg/dL    GFR Estimate >90 >60 mL/min/1.73m2    Calcium 8.6 (L) 8.8 - 10.2 mg/dL    Glucose 125 (H) 70 - 99 mg/dL   Osmolality   Result Value Ref Range    Osmolality Blood 259 (L) 280 - 301 mmol/kg    Narrative    Greater than 385 mmol/kg relates to stupor in hyperglycemia   Greater than 400 mmol/kg can relate to seizures   Greater than 420 mmol/kg can be lethal    Serum Osmalar Gap:   Normal <10   Larger suggest unmeasured substances present in serum (ethanol, methanol, isopropanol, mannitol, ethylene glycol).   Sodium random urine   Result Value Ref Range    Sodium Urine mmol/L 89 mmol/L   Sodium   Result Value Ref Range    Sodium 123 (L) 135 - 145 mmol/L   Sodium   Result Value Ref Range    Sodium 128 (L) 135 - 145  mmol/L   Sodium   Result Value Ref Range    Sodium 127 (L) 135 - 145 mmol/L   Sodium   Result Value Ref Range    Sodium 128 (L) 135 - 145 mmol/L   Sodium   Result Value Ref Range    Sodium 126 (L) 135 - 145 mmol/L   CBC with platelets   Result Value Ref Range    WBC Count 3.9 (L) 4.0 - 11.0 10e3/uL    RBC Count 4.07 3.80 - 5.20 10e6/uL    Hemoglobin 12.6 11.7 - 15.7 g/dL    Hematocrit 37.1 35.0 - 47.0 %    MCV 91 78 - 100 fL    MCH 31.0 26.5 - 33.0 pg    MCHC 34.0 31.5 - 36.5 g/dL    RDW 12.3 10.0 - 15.0 %    Platelet Count 293 150 - 450 10e3/uL   Basic metabolic panel   Result Value Ref Range    Sodium 126 (L) 135 - 145 mmol/L    Potassium 4.0 3.4 - 5.3 mmol/L    Chloride 92 (L) 98 - 107 mmol/L    Carbon Dioxide (CO2) 24 22 - 29 mmol/L    Anion Gap 10 7 - 15 mmol/L    Urea Nitrogen 7.8 (L) 8.0 - 23.0 mg/dL    Creatinine 0.79 0.51 - 0.95 mg/dL    GFR Estimate 84 >60 mL/min/1.73m2    Calcium 8.3 (L) 8.8 - 10.2 mg/dL    Glucose 107 (H) 70 - 99 mg/dL

## 2023-11-30 NOTE — PROGRESS NOTES
Occupational Therapy: Orders received. Chart reviewed and discussed with care team.? Occupational Therapy not indicated due to spoke with RN and patient. Pt reports she knows she was confused because of low sodium. A&Ox4, feels at baseline; no evidence of confusion or acute OT needs. Confusion resolved, RN and pt with no concerns.  Defer discharge recommendations Care team.? Will complete orders.

## 2023-12-01 ENCOUNTER — APPOINTMENT (OUTPATIENT)
Dept: PHYSICAL THERAPY | Facility: CLINIC | Age: 63
DRG: 641 | End: 2023-12-01
Payer: COMMERCIAL

## 2023-12-01 LAB
ANION GAP SERPL CALCULATED.3IONS-SCNC: 11 MMOL/L (ref 7–15)
BASOPHILS # BLD AUTO: 0 10E3/UL (ref 0–0.2)
BASOPHILS NFR BLD AUTO: 0 %
BUN SERPL-MCNC: 11.1 MG/DL (ref 8–23)
CALCIUM SERPL-MCNC: 8.6 MG/DL (ref 8.8–10.2)
CHLORIDE SERPL-SCNC: 96 MMOL/L (ref 98–107)
CORTIS SERPL-MCNC: 16 UG/DL
CREAT SERPL-MCNC: 0.85 MG/DL (ref 0.51–0.95)
DEPRECATED HCO3 PLAS-SCNC: 22 MMOL/L (ref 22–29)
EGFRCR SERPLBLD CKD-EPI 2021: 77 ML/MIN/1.73M2
EOSINOPHIL # BLD AUTO: 0.1 10E3/UL (ref 0–0.7)
EOSINOPHIL NFR BLD AUTO: 2 %
ERYTHROCYTE [DISTWIDTH] IN BLOOD BY AUTOMATED COUNT: 12.4 % (ref 10–15)
GLUCOSE SERPL-MCNC: 99 MG/DL (ref 70–99)
HCT VFR BLD AUTO: 37.4 % (ref 35–47)
HGB BLD-MCNC: 12.9 G/DL (ref 11.7–15.7)
IMM GRANULOCYTES # BLD: 0 10E3/UL
IMM GRANULOCYTES NFR BLD: 0 %
LYMPHOCYTES # BLD AUTO: 1.7 10E3/UL (ref 0.8–5.3)
LYMPHOCYTES NFR BLD AUTO: 33 %
MAGNESIUM SERPL-MCNC: 2.2 MG/DL (ref 1.7–2.3)
MCH RBC QN AUTO: 31 PG (ref 26.5–33)
MCHC RBC AUTO-ENTMCNC: 34.5 G/DL (ref 31.5–36.5)
MCV RBC AUTO: 90 FL (ref 78–100)
MONOCYTES # BLD AUTO: 0.8 10E3/UL (ref 0–1.3)
MONOCYTES NFR BLD AUTO: 16 %
NEUTROPHILS # BLD AUTO: 2.5 10E3/UL (ref 1.6–8.3)
NEUTROPHILS NFR BLD AUTO: 49 %
NRBC # BLD AUTO: 0 10E3/UL
NRBC BLD AUTO-RTO: 0 /100
PLATELET # BLD AUTO: 308 10E3/UL (ref 150–450)
POTASSIUM SERPL-SCNC: 4.3 MMOL/L (ref 3.4–5.3)
RBC # BLD AUTO: 4.16 10E6/UL (ref 3.8–5.2)
SODIUM SERPL-SCNC: 126 MMOL/L (ref 135–145)
SODIUM SERPL-SCNC: 126 MMOL/L (ref 135–145)
SODIUM SERPL-SCNC: 128 MMOL/L (ref 135–145)
SODIUM SERPL-SCNC: 129 MMOL/L (ref 135–145)
SODIUM SERPL-SCNC: 129 MMOL/L (ref 135–145)
WBC # BLD AUTO: 5.2 10E3/UL (ref 4–11)

## 2023-12-01 PROCEDURE — 85025 COMPLETE CBC W/AUTO DIFF WBC: CPT | Performed by: STUDENT IN AN ORGANIZED HEALTH CARE EDUCATION/TRAINING PROGRAM

## 2023-12-01 PROCEDURE — 120N000001 HC R&B MED SURG/OB

## 2023-12-01 PROCEDURE — 84295 ASSAY OF SERUM SODIUM: CPT | Performed by: STUDENT IN AN ORGANIZED HEALTH CARE EDUCATION/TRAINING PROGRAM

## 2023-12-01 PROCEDURE — 99254 IP/OBS CNSLTJ NEW/EST MOD 60: CPT | Performed by: INTERNAL MEDICINE

## 2023-12-01 PROCEDURE — 36415 COLL VENOUS BLD VENIPUNCTURE: CPT | Performed by: INTERNAL MEDICINE

## 2023-12-01 PROCEDURE — 83735 ASSAY OF MAGNESIUM: CPT | Performed by: STUDENT IN AN ORGANIZED HEALTH CARE EDUCATION/TRAINING PROGRAM

## 2023-12-01 PROCEDURE — 250N000013 HC RX MED GY IP 250 OP 250 PS 637: Performed by: STUDENT IN AN ORGANIZED HEALTH CARE EDUCATION/TRAINING PROGRAM

## 2023-12-01 PROCEDURE — 97116 GAIT TRAINING THERAPY: CPT | Mod: GP

## 2023-12-01 PROCEDURE — 36415 COLL VENOUS BLD VENIPUNCTURE: CPT | Performed by: STUDENT IN AN ORGANIZED HEALTH CARE EDUCATION/TRAINING PROGRAM

## 2023-12-01 PROCEDURE — 258N000003 HC RX IP 258 OP 636: Performed by: STUDENT IN AN ORGANIZED HEALTH CARE EDUCATION/TRAINING PROGRAM

## 2023-12-01 PROCEDURE — 84295 ASSAY OF SERUM SODIUM: CPT | Performed by: INTERNAL MEDICINE

## 2023-12-01 PROCEDURE — 82533 TOTAL CORTISOL: CPT | Performed by: STUDENT IN AN ORGANIZED HEALTH CARE EDUCATION/TRAINING PROGRAM

## 2023-12-01 PROCEDURE — 99233 SBSQ HOSP IP/OBS HIGH 50: CPT | Performed by: STUDENT IN AN ORGANIZED HEALTH CARE EDUCATION/TRAINING PROGRAM

## 2023-12-01 RX ORDER — SODIUM CHLORIDE 9 MG/ML
INJECTION, SOLUTION INTRAVENOUS CONTINUOUS
Status: DISCONTINUED | OUTPATIENT
Start: 2023-12-01 | End: 2023-12-02 | Stop reason: HOSPADM

## 2023-12-01 RX ADMIN — SODIUM CHLORIDE: 9 INJECTION, SOLUTION INTRAVENOUS at 14:11

## 2023-12-01 RX ADMIN — GUAIFENESIN 200 MG: 200 SOLUTION ORAL at 11:28

## 2023-12-01 ASSESSMENT — ACTIVITIES OF DAILY LIVING (ADL)
ADLS_ACUITY_SCORE: 22

## 2023-12-01 NOTE — CONSULTS
RENAL CONSULTATION NOTE      REFERRING MD:  Zackary    REASON FOR CONSULTATION:  Hyponatremia         A/P:     1.  Baseline normal renal function  2.  Hyponatremia   -hypo osmolar   -multifactorial    -spironolactone    -NSAIDs (600 mg/day for several days    -excess water intake     Uosm on presentation not available  Low value of 186 may represent ADH release  May also be consistent with aggressive H2O intake    Has responded to NS as would be expected based on Uosm    Current rate of correction appropriate    1.  Limit ibuprofen use  2.  Discontinue spironolactone  3.  NS @ 75 ml/hr for 4 hours     OK to discharge if Na in 130 range later today  BMP early next week         HPI:     63-year-old female presented to the ED dated 11/29/2023 for evaluation of symptoms related potential dehydration.    ED evaluation demonstrated blood pressure of 155/94.  Laboratory evaluation demonstrating significant hyponatremia with a serum sodium of 119 mmol/L.    Preadmission history includes significant NSAID use.  In addition recently started on spironolactone 50 mg daily.  Drinks at least 50 ounces of water daily by report.    Patient received isotonic fluid on presentation with an increase in her sodium from 119 to 128 mmol/L.    Concern for overcorrection was raised and she was treated with hypotonic fluid in the form of D5.    Serum sodium levels have been in the range of 126 to 129 mmol/L over the past 24 to 36 hours.    Value this morning noted at 126 mmol/L.    Patient seen and examined.  Awake and alert.  Appropriate.  Acknowledges above history.    Denies headache or chest pain at this time.  Good urine output.  Blood pressure controlled.    No current neurologic symptoms.    TSH and cortisol level documented in normal range.      ROS:      A complete 10 point review of systems was performed and is negative except as noted above.    PMH:    Past Medical History:   Diagnosis Date    Basal cell carcinoma      Diverticulosis of large intestine     Squamous cell carcinoma        PSH:    Past Surgical History:   Procedure Laterality Date    BIOPSY OF SKIN LESION      CHOLECYSTECTOMY      COLONOSCOPY      MOHS MICROGRAPHIC PROCEDURE         MEDICATIONS:    No current outpatient medications on file.       ALLERGIES:    Allergies as of 11/29/2023 - Reviewed 11/29/2023   Allergen Reaction Noted    No known drug allergy  06/14/2012       FH:    Family History   Problem Relation Age of Onset    Cancer No family hx of         No known family hx of skin cancer       SH:    Social History     Socioeconomic History    Marital status:      Spouse name: Not on file    Number of children: Not on file    Years of education: Not on file    Highest education level: Not on file   Occupational History    Not on file   Tobacco Use    Smoking status: Never    Smokeless tobacco: Never   Substance and Sexual Activity    Alcohol use: Not on file    Drug use: Not on file    Sexual activity: Not on file   Other Topics Concern    Parent/sibling w/ CABG, MI or angioplasty before 65F 55M? Not Asked   Social History Narrative    Not on file     Social Determinants of Health     Financial Resource Strain: Not on file   Food Insecurity: Not on file   Transportation Needs: Not on file   Physical Activity: Not on file   Stress: Not on file   Social Connections: Not on file   Interpersonal Safety: Not on file   Housing Stability: Not on file       PHYSICAL EXAM:      Vitals were reviewed  Patient Vitals for the past 8 hrs:   BP Temp Temp src Pulse Resp SpO2   12/01/23 0746 135/81 98.2  F (36.8  C) Oral 89 18 96 %     Wt Readings from Last 4 Encounters:   11/29/23 62.1 kg (137 lb)   06/14/12 64.3 kg (141 lb 12.8 oz)     I/O last 3 completed shifts:  In: 243 [P.O.:240; I.V.:3]  Out: 200 [Urine:200]      Vitals:    11/29/23 0615   Weight: 62.1 kg (137 lb)       GENERAL: awake, alert, follows  HEENT: NC/AT, PERRLA, EOMI, non icteric, pharynx moist  without lesion  RESP:  clear anteriorly  CV: RRR, normal S1 S2  ABDOMEN: soft, nontender, no HSM or masses and bowel sounds normal  MS: no clubbing, cyanosis   SKIN: clear without significant rashes or lesions  NEURO: speech normal and cranial nerves 2-12 intact  PSYCH: affect normal/bright  EXT: warm, no edema      LABS:        Recent Labs   Lab 12/01/23  1001 12/01/23  0615   * 129*  129*   POTASSIUM  --  4.3   CHLORIDE  --  96*   CO2  --  22   ANIONGAP  --  11   GLC  --  99   BUN  --  11.1   CR  --  0.85   GFRESTIMATED  --  77   JORJE  --  8.6*     Recent Labs   Lab Test 12/01/23  1001 12/01/23  0615 12/01/23  0137 11/30/23  2222 11/30/23  1745 11/30/23  1411 11/30/23  0724 11/30/23  0210 11/29/23  2157 11/29/23  1757   * 129*  129* 126* 126* 127* 127* 126* 126* 128* 127*     No lab results found in last 7 days.  Recent Labs   Lab 12/01/23  0615 11/30/23  1411 11/30/23  1053 11/29/23  1008 11/29/23  0923   OSMOSERUM  --   --   --   --  259*   UROSMOLALITY  --   --  186  --   --    UNAR  --   --  56 89  --    TSH  --  1.49  --   --   --    JAZMINE 16.0  --   --   --   --        DIAGNOSTICS:  Lyle Valenzuela    [facility]  [phone]

## 2023-12-01 NOTE — PLAN OF CARE
Care from [id number]      Admit Date: 11/29/2023  Admitting Diagnosis: Hyponatremia, hypocalcemia  Pertinent History: recent covid infection (11/21), Weakness, Hair loss on spirolactone, Insomnia    Neuro: Alert and Oriented x4  Activity: are independent with no assistive devices   Telemetry Monitoring: No  Pain: denying pain.  Labs / Tests: Mag and K standard protocol. No replacement given during this shift. Na q4 hrs: 126, 126  GI: Bowel sounds audible, denies nausea.  : voiding spontaneously  LDA's: Peripheral  Fluids: is Saline locked.  Neuros: intact  Diet: Regular  Consults: PT  Discharge Disposition:  TBD    Plan of Care:    Electrolyte monitoring and replacement.   Neuro: Q4

## 2023-12-01 NOTE — PROGRESS NOTES
[facility]    Medicine Progress Note - Hospitalist Service    Date of Admission:  11/29/2023    Assessment & Plan     Acute vs subacute hyponatremia  Symptomatic hyponatremia with confusion, weakness  63-year-old female with little past medical history presented to the ED with confusion, weakness, dizziness.  Had a recent COVID infection that was being treated with Paxlovid.  In the ED found to be hyponatremic to 119 and was symptomatic with confusion, fatigue, wobbly gait.  Her sodium alo to 128 after maintenance normal saline, was given D5W to avoid overcorrection.  Have since stopped giving IV fluids to see how she does with normal intake, however her sodium remained relatively stagnant at 129 and then dropped further today to 126.  She notes she usually drinks 50 ounces of water per day, no other drinks other than coffee.  I suspect she is drinking too much water, and actually has what appears to be euvolemic/hypo-osmolar hyponatremia.  While initially her sodium alo with normal saline consistent with hypovolemic hyponatremia, I think she is euvolemic and was actually having adequate oral intake as noted by her normal vital signs and no evidence of JOE or volume depletion.  Her urine studies were not consistent with hypovolemic hyponatremia either with an elevated urine osmolality and elevated urine sodium, possibly some element of ADH response.  Also note that she is recently been started on spironolactone 50 mg 2 to 3 months ago which may be further obfuscating presentation  -Consulted and discussed with nephrology, appreciate assistance  -Will continue normal saline at 75 mL/h with sodium checks every 6, with goal of normal range sodium.  -Will fluid restrict her now and at discharge to 1200 mL/day and follow-up sodium recheck probably on Monday  -Discontinue spironolactone indefinitely    Recent COVID infection  Tested positive on 11/21, symptoms started on 11/19.  Treated with  course of Paxlovid at home.  No respiratory issues on arrival, not needing any supplemental oxygen.     Hair loss  Was taking spironolactone for this PTA.  Held on admission due to hypovolemia and hyponatremia.  Discontinue.    Insomnia  PTA taking modafinil.  Holding for now.          Diet: Combination Diet Regular Diet Adult    DVT Prophylaxis: Pneumatic Compression Devices  Diehl Catheter: Not present  Lines: None     Cardiac Monitoring: None  Code Status: Full Code      Clinically Significant Risk Factors         # Hyponatremia: Lowest Na = 123 mmol/L in last 2 days, will monitor as appropriate                            Disposition Plan     Expected Discharge Date: 12/01/2023, 12:00 PM                  Shabnam Raymundo MD  Hospitalist Service  [facility]  Securely message with [employer] (more info)  Text page via [employer] Paging/Directory   ______________________________________________________________________    Interval History   Overnight felt mental status improved but feels symptomatic once her sodium goes below 127.    Physical Exam   Vital Signs: Temp: 98.2  F (36.8  C) Temp src: Oral BP: 135/81 Pulse: 89   Resp: 18 SpO2: 96 % O2 Device: None (Room air)    Weight: 137 lbs 0 oz    Constitutional: Awake, alert, no acute distress  Respiratory: No respiratory distress  Cardiovascular: Regular rate and rhythm  Skin: No rashes, no cyanosis, no edema.  Musculoskeletal: No deformities noted.  No erythema or tenderness. Moving all extremities.  Neurologic: No focal deficits noted. Speech is clear.   Psychiatric: Appropriate affect.    Medical Decision Making       60 MINUTES SPENT BY ME on the date of service doing chart review, history, exam, documentation & further activities per the note.        Data     I have personally reviewed the following data over the past 24 hrs:    5.2  \   12.9   / 308     129 (L); 129 (L) 96 (L) 11.1 /  99   4.3 22 0.85 \     TSH: 1.49 T4: N/A A1C: N/A         Lab  Results   Component Value Date     (L) 12/01/2023     (L) 12/01/2023     (L) 12/01/2023     (L) 12/01/2023     (L) 11/30/2023     (L) 11/30/2023     (L) 11/30/2023     (L) 11/30/2023     (L) 11/30/2023     (L) 11/29/2023     (L) 11/29/2023     (L) 11/29/2023     (L) 11/29/2023     (L) 11/29/2023     (LL) 11/29/2023       Results for orders placed or performed during the hospital encounter of 11/29/23 (from the past 24 hour(s))   Sodium   Result Value Ref Range    Sodium 126 (L) 135 - 145 mmol/L   Sodium   Result Value Ref Range    Sodium 126 (L) 135 - 145 mmol/L   CBC with Platelets & Differential    Narrative    The following orders were created for panel order CBC with Platelets & Differential.  Procedure                               Abnormality         Status                     ---------                               -----------         ------                     CBC with platelets and d...[[id number]]                      Final result                 Please view results for these tests on the individual orders.   Magnesium   Result Value Ref Range    Magnesium 2.2 1.7 - 2.3 mg/dL   Sodium   Result Value Ref Range    Sodium 129 (L) 135 - 145 mmol/L   Cortisol   Result Value Ref Range    Cortisol 16.0   ug/dL   Basic metabolic panel   Result Value Ref Range    Sodium 129 (L) 135 - 145 mmol/L    Potassium 4.3 3.4 - 5.3 mmol/L    Chloride 96 (L) 98 - 107 mmol/L    Carbon Dioxide (CO2) 22 22 - 29 mmol/L    Anion Gap 11 7 - 15 mmol/L    Urea Nitrogen 11.1 8.0 - 23.0 mg/dL    Creatinine 0.85 0.51 - 0.95 mg/dL    GFR Estimate 77 >60 mL/min/1.73m2    Calcium 8.6 (L) 8.8 - 10.2 mg/dL    Glucose 99 70 - 99 mg/dL   CBC with platelets and differential   Result Value Ref Range    WBC Count 5.2 4.0 - 11.0 10e3/uL    RBC Count 4.16 3.80 - 5.20 10e6/uL    Hemoglobin 12.9 11.7 - 15.7 g/dL    Hematocrit 37.4 35.0 - 47.0 %    MCV 90 78 - 100  fL    MCH 31.0 26.5 - 33.0 pg    MCHC 34.5 31.5 - 36.5 g/dL    RDW 12.4 10.0 - 15.0 %    Platelet Count 308 150 - 450 10e3/uL    % Neutrophils 49 %    % Lymphocytes 33 %    % Monocytes 16 %    % Eosinophils 2 %    % Basophils 0 %    % Immature Granulocytes 0 %    NRBCs per 100 WBC 0 <1 /100    Absolute Neutrophils 2.5 1.6 - 8.3 10e3/uL    Absolute Lymphocytes 1.7 0.8 - 5.3 10e3/uL    Absolute Monocytes 0.8 0.0 - 1.3 10e3/uL    Absolute Eosinophils 0.1 0.0 - 0.7 10e3/uL    Absolute Basophils 0.0 0.0 - 0.2 10e3/uL    Absolute Immature Granulocytes 0.0 <=0.4 10e3/uL    Absolute NRBCs 0.0 10e3/uL   Sodium   Result Value Ref Range    Sodium 126 (L) 135 - 145 mmol/L

## 2023-12-01 NOTE — PLAN OF CARE
Physical Therapy Discharge Summary    Reason for therapy discharge:    All goals and outcomes met, no further needs identified.    Progress towards therapy goal(s). See goals on Care Plan in [facility] electronic health record for goal details.  Goals met    Therapy recommendation(s):    Continued therapy is recommended.  Rationale/Recommendations:  Pt is functioning close to baseline. Pt able to complete all mobility necessary to safely return home. Recommend outpatient PT for higher level balance assessment..

## 2023-12-01 NOTE — PLAN OF CARE
Goal Outcome Evaluation:    AO x4. Neuros intact. Denies pain. On K, Mg protocol, recheck in AM. Na timed q4h. Pt noted urinary frequency, UA done, pt later states no symptoms. Pt feels much better, independent in room, ambulates in hallways. Plan: continue POC.

## 2023-12-02 VITALS
OXYGEN SATURATION: 98 % | HEART RATE: 84 BPM | DIASTOLIC BLOOD PRESSURE: 62 MMHG | HEIGHT: 69 IN | BODY MASS INDEX: 20.29 KG/M2 | SYSTOLIC BLOOD PRESSURE: 133 MMHG | TEMPERATURE: 98.1 F | WEIGHT: 137 LBS | RESPIRATION RATE: 16 BRPM

## 2023-12-02 LAB
HOLD SPECIMEN: NORMAL
MAGNESIUM SERPL-MCNC: 2.2 MG/DL (ref 1.7–2.3)
POTASSIUM SERPL-SCNC: 4.1 MMOL/L (ref 3.4–5.3)
SODIUM SERPL-SCNC: 131 MMOL/L (ref 135–145)
SODIUM SERPL-SCNC: 131 MMOL/L (ref 135–145)
SODIUM SERPL-SCNC: 135 MMOL/L (ref 135–145)

## 2023-12-02 PROCEDURE — 36415 COLL VENOUS BLD VENIPUNCTURE: CPT | Performed by: INTERNAL MEDICINE

## 2023-12-02 PROCEDURE — 83735 ASSAY OF MAGNESIUM: CPT | Performed by: INTERNAL MEDICINE

## 2023-12-02 PROCEDURE — 258N000003 HC RX IP 258 OP 636: Performed by: STUDENT IN AN ORGANIZED HEALTH CARE EDUCATION/TRAINING PROGRAM

## 2023-12-02 PROCEDURE — 99239 HOSP IP/OBS DSCHRG MGMT >30: CPT | Performed by: STUDENT IN AN ORGANIZED HEALTH CARE EDUCATION/TRAINING PROGRAM

## 2023-12-02 PROCEDURE — 84132 ASSAY OF SERUM POTASSIUM: CPT | Performed by: INTERNAL MEDICINE

## 2023-12-02 PROCEDURE — 84295 ASSAY OF SERUM SODIUM: CPT | Performed by: INTERNAL MEDICINE

## 2023-12-02 PROCEDURE — 250N000013 HC RX MED GY IP 250 OP 250 PS 637: Performed by: STUDENT IN AN ORGANIZED HEALTH CARE EDUCATION/TRAINING PROGRAM

## 2023-12-02 RX ADMIN — SODIUM CHLORIDE: 9 INJECTION, SOLUTION INTRAVENOUS at 02:09

## 2023-12-02 RX ADMIN — GUAIFENESIN 200 MG: 200 SOLUTION ORAL at 05:47

## 2023-12-02 ASSESSMENT — ACTIVITIES OF DAILY LIVING (ADL)
ADLS_ACUITY_SCORE: 22

## 2023-12-02 NOTE — PROGRESS NOTES
Na up to 131 with IV NS.    I think she can be discharged with BMP next week.    Avoid spironolactone.      Wesley Rachel MD

## 2023-12-02 NOTE — PROGRESS NOTES
Discharge instructions reviewed with patient and family at bedside, belonging verified and sent with patient. PIV removed. No discharge barriers identified.     Anayeli Daniel RN on 12/2/2023 at 3:20 PM

## 2023-12-02 NOTE — DISCHARGE SUMMARY
[facility]  Hospitalist Discharge Summary      Date of Admission:  11/29/2023  Date of Discharge:  12/2/2023  Discharging Provider: Shabnam Raymundo MD  Discharge Service: Hospitalist Service    Discharge Diagnoses   Acute vs subacute hyponatremia, suspect euvolemic/hypoosmolar hyponatremia  Symptomatic hyponatremia with confusion, weakness  Recent COVID Infection  Pulmonary nodule    Clinically Significant Risk Factors          Follow-ups Needed After Discharge   Follow-up with your primary care physician this week, we will send a lab order to have your sodium checked on Monday 12/4.  In the meantime please restrict your total fluid intake to less than 1200 mL/day  Stop taking spironolactone, minoxidil, and ibuprofen  Follow-up pulmonary nodule (subpleural 6mm anterior RML), recommend follow up CT as outpatient in next month    Discharge Disposition   Discharged to home  Condition at discharge: Stable    Hospital Course   Acute vs subacute hyponatremia  Symptomatic hyponatremia with confusion, weakness  63-year-old female with little past medical history presented to the ED with confusion, weakness, dizziness.  Had a recent COVID infection that was being treated with Paxlovid.  In the ED found to be hyponatremic to 119 and was symptomatic with confusion, fatigue, wobbly gait.  Her sodium alo to 128 after maintenance normal saline, was given D5W to avoid overcorrection.  Have since stopped giving IV fluids to see how she does with normal intake, however her sodium remained relatively stagnant at 129 and then dropped further today to 126.  She notes she usually drinks 50 ounces of water per day, no other drinks other than coffee.  I suspect she is drinking too much water, and actually has what appears to be euvolemic/hypo-osmolar hyponatremia.  While initially her sodium alo with normal saline consistent with hypovolemic hyponatremia, I think she is euvolemic and was actually having adequate  oral intake as noted by her normal vital signs and no evidence of JOE or volume depletion.  Her urine studies were not consistent with hypovolemic hyponatremia either with an elevated urine osmolality and elevated urine sodium, possibly some element of ADH response.  Also note that she is recently been started on spironolactone 50 mg 2 to 3 months ago which may be contributor.  -Consulted and discussed with nephrology  -Kept her on isotonic saline overnight with fluid restriction with sodium trending up   -fluid restriction at discharge discharge to 1200 mL/day and follow-up sodium recheck probably on 12/4  -Discontinue spironolactone indefinitely    Recent COVID infection  Tested positive on 11/21, symptoms started on 11/19.  Treated with course of Paxlovid at home.  No respiratory issues on arrival, not needing any supplemental oxygen.     Hair loss  Was taking spironolactone for this PTA.  Held on admission due to hypovolemia and hyponatremia.  Discontinue.      Consultations This Hospital Stay   PHYSICAL THERAPY ADULT IP CONSULT  OCCUPATIONAL THERAPY ADULT IP CONSULT  NEPHROLOGY IP CONSULT    Code Status   Full Code    Time Spent on this Encounter   I, Shabnam Raymundo MD, personally saw the patient today and spent greater than 30 minutes discharging this patient.       Shabnam Raymundo MD  [place] [address] [facility]  [address] [place] [address]  Phone: [phone]  Fax: [phone]  ______________________________________________________________________    Physical Exam   Vital Signs: Temp: 98.1  F (36.7  C) Temp src: Oral BP: 133/62 Pulse: 84   Resp: 16 SpO2: 98 % O2 Device: None (Room air)    Weight: 137 lbs 0 oz         Primary Care Physician   Physician No Ref-Primary    Discharge Orders       Significant Results and Procedures     Lab Results   Component Value Date     (L) 12/02/2023     (L) 12/01/2023     (L) 12/01/2023     (L) 12/01/2023      (L) 12/01/2023     (L) 12/01/2023     (L) 12/01/2023     (L) 11/30/2023     (L) 11/30/2023     (L) 11/30/2023     (L) 11/30/2023     (L) 11/30/2023     (L) 11/29/2023     (L) 11/29/2023     (L) 11/29/2023     (L) 11/29/2023     (L) 11/29/2023     (LL) 11/29/2023     Discharge Medications   Current Discharge Medication List        CONTINUE these medications which have NOT CHANGED    Details   omeprazole (PRILOSEC) 20 MG DR capsule Take 20 mg by mouth daily as needed (reflux)           STOP taking these medications       minoxidil (LONITEN) 2.5 MG tablet Comments:   Reason for Stopping:         spironolactone (ALDACTONE) 50 MG tablet Comments:   Reason for Stopping:             Allergies   Allergies   Allergen Reactions    No Known Drug Allergy

## 2023-12-02 NOTE — PLAN OF CARE
Goal Outcome Evaluation:      Plan of Care Reviewed With: patient    Temp: (P) 97.3  F (36.3  C) Temp src: (P) Oral BP: (P) 115/65 Pulse: (P) 76   Resp: (P) 16 SpO2: (P) 97 % O2 Device: (P) None (Room air)        VSS on RA. A&O x4. Independent. Neuros q4 hr. Intact on both assessments. Sodium checks q6 hr. 0000: 131. NS running at 75 mL/hr. Plan to discharge today assuming sodium levels continue to increase.

## 2023-12-02 NOTE — PLAN OF CARE
"Goal Outcome Evaluation:    End of Shift Summary Note: [id number]    Alert and oriented x 4, vital signs stable, afebrile, on room air. No pain, no chest pain or chest pressure, no nausea or vomiting. Tolerating diet. Up indep. Started on NS at 75 mL/hr, monitor Na+ Q 6 hrs. Nephrology consulted 12/01. Anticipated discharge likely tomorrow pending sodium improvement. Last sodium check at 6PM 128.     Vital signs:  Temp: 98.8  F (37.1  C) Temp src: Oral BP: (!) 141/90 Pulse: 80   Resp: 20 SpO2: 97 % O2 Device: None (Room air)   Height: 175.3 cm (5' 9\") Weight: 62.1 kg (137 lb)  Estimated body mass index is 20.23 kg/m  as calculated from the following:    Height as of this encounter: 1.753 m (5' 9\").    Weight as of this encounter: 62.1 kg (137 lb).    Anayeli Daniel RN on 12/1/2023 at 11:35 PM    "

## 2023-12-04 ENCOUNTER — LAB (OUTPATIENT)
Dept: LAB | Facility: CLINIC | Age: 63
End: 2023-12-04
Payer: COMMERCIAL

## 2023-12-04 DIAGNOSIS — E87.1 HYPONATREMIA: ICD-10-CM

## 2023-12-04 LAB — SODIUM SERPL-SCNC: 136 MMOL/L (ref 135–145)

## 2023-12-04 PROCEDURE — 36415 COLL VENOUS BLD VENIPUNCTURE: CPT

## 2023-12-04 PROCEDURE — 84295 ASSAY OF SERUM SODIUM: CPT

## 2024-02-03 ENCOUNTER — HEALTH MAINTENANCE LETTER (OUTPATIENT)
Age: 64
End: 2024-02-03

## 2024-06-14 ENCOUNTER — OFFICE VISIT (OUTPATIENT)
Dept: OPHTHALMOLOGY | Facility: CLINIC | Age: 64
End: 2024-06-14
Attending: STUDENT IN AN ORGANIZED HEALTH CARE EDUCATION/TRAINING PROGRAM
Payer: COMMERCIAL

## 2024-06-14 DIAGNOSIS — H02.88B MEIBOMIAN GLAND DYSFUNCTION (MGD) OF UPPER AND LOWER LIDS OF BOTH EYES: Primary | ICD-10-CM

## 2024-06-14 DIAGNOSIS — H02.88A MEIBOMIAN GLAND DYSFUNCTION (MGD) OF UPPER AND LOWER LIDS OF BOTH EYES: Primary | ICD-10-CM

## 2024-06-14 DIAGNOSIS — H00.11 CHALAZION OF RIGHT UPPER EYELID: ICD-10-CM

## 2024-06-14 PROBLEM — Z85.828 HX OF BASAL CELL CARCINOMA: Status: ACTIVE | Noted: 2020-11-18

## 2024-06-14 PROBLEM — N95.2 ATROPHIC VAGINITIS: Status: ACTIVE | Noted: 2023-12-13

## 2024-06-14 PROBLEM — L98.9 SKIN ABNORMALITIES: Status: ACTIVE | Noted: 2024-06-14

## 2024-06-14 PROBLEM — K21.9 GASTROESOPHAGEAL REFLUX DISEASE WITHOUT ESOPHAGITIS: Status: ACTIVE | Noted: 2024-02-01

## 2024-06-14 PROBLEM — R91.1 PULMONARY NODULE: Status: ACTIVE | Noted: 2023-06-29

## 2024-06-14 PROBLEM — I10 HTN (HYPERTENSION): Status: ACTIVE | Noted: 2023-12-13

## 2024-06-14 PROCEDURE — 99203 OFFICE O/P NEW LOW 30 MIN: CPT | Performed by: STUDENT IN AN ORGANIZED HEALTH CARE EDUCATION/TRAINING PROGRAM

## 2024-06-14 PROCEDURE — G0463 HOSPITAL OUTPT CLINIC VISIT: HCPCS | Performed by: STUDENT IN AN ORGANIZED HEALTH CARE EDUCATION/TRAINING PROGRAM

## 2024-06-14 RX ORDER — NEOMYCIN SULFATE, POLYMYXIN B SULFATE, AND DEXAMETHASONE 3.5; 10000; 1 MG/G; [USP'U]/G; MG/G
OINTMENT OPHTHALMIC
COMMUNITY
Start: 2024-06-03

## 2024-06-14 RX ORDER — FLUOCINOLONE ACETONIDE 0.11 MG/ML
OIL AURICULAR (OTIC)
COMMUNITY
Start: 2022-10-02

## 2024-06-14 RX ORDER — PROGESTERONE 100 MG/1
100 CAPSULE ORAL AT BEDTIME
COMMUNITY
Start: 2024-05-21

## 2024-06-14 RX ORDER — AMLODIPINE BESYLATE 5 MG/1
TABLET ORAL
COMMUNITY
Start: 2024-03-05

## 2024-06-14 RX ORDER — RESPIRATORY SYNCYTIAL VIRUS VACCINE 120MCG/0.5
KIT INTRAMUSCULAR
COMMUNITY
Start: 2023-09-09

## 2024-06-14 RX ORDER — ESTRADIOL 0.1 MG/G
CREAM VAGINAL
COMMUNITY
Start: 2023-12-15

## 2024-06-14 RX ORDER — PROPRANOLOL HYDROCHLORIDE 10 MG/1
10 TABLET ORAL
COMMUNITY
Start: 2024-01-09

## 2024-06-14 RX ORDER — ESOMEPRAZOLE MAGNESIUM 40 MG/1
CAPSULE, DELAYED RELEASE ORAL
COMMUNITY
Start: 2024-01-24

## 2024-06-14 RX ORDER — ERYTHROMYCIN 5 MG/G
OINTMENT OPHTHALMIC
COMMUNITY
Start: 2024-05-31

## 2024-06-14 RX ORDER — TRAZODONE HYDROCHLORIDE 50 MG/1
1 TABLET, FILM COATED ORAL AT BEDTIME
COMMUNITY
Start: 2024-02-01

## 2024-06-14 ASSESSMENT — VISUAL ACUITY
OD_CC+: -2
OD_SC: 20/25
OS_SC: 20/40
OD_CC: 20/20
CORRECTION_TYPE: GLASSES
OD_SC+: -2
OS_SC+: -2
OS_CC: 20/25
METHOD: SNELLEN - LINEAR

## 2024-06-14 ASSESSMENT — CONF VISUAL FIELD
OS_INFERIOR_TEMPORAL_RESTRICTION: 0
OS_INFERIOR_NASAL_RESTRICTION: 0
OD_NORMAL: 1
OS_SUPERIOR_NASAL_RESTRICTION: 0
OD_INFERIOR_NASAL_RESTRICTION: 0
OS_NORMAL: 1
OD_INFERIOR_TEMPORAL_RESTRICTION: 0
METHOD: COUNTING FINGERS
OS_SUPERIOR_TEMPORAL_RESTRICTION: 0
OD_SUPERIOR_TEMPORAL_RESTRICTION: 0
OD_SUPERIOR_NASAL_RESTRICTION: 0

## 2024-06-14 ASSESSMENT — TONOMETRY
OD_IOP_MMHG: 10
IOP_METHOD: ICARE
OS_IOP_MMHG: 10

## 2024-06-14 ASSESSMENT — SLIT LAMP EXAM - LIDS: COMMENTS: MGD

## 2024-06-14 ASSESSMENT — EXTERNAL EXAM - RIGHT EYE: OD_EXAM: WNL

## 2024-06-14 ASSESSMENT — EXTERNAL EXAM - LEFT EYE: OS_EXAM: WNL

## 2024-06-14 NOTE — PROGRESS NOTES
HPI       Chalazion Evaluation     Additional comments: Pt here for chalazion/stye right eye.              Comments    Pt was set to have I&D with Dr Maddox @[facility], but provider is not in network. Pt has hx of BCC. Pt notes chalazion has been present x3 weeks. Pt states this is her first one.   Pt using warm compresses 3xdaily and using maxitrol ophthalmic ointment and previously erythromycin ophthalmic ointment. Vision is mildly blurred. Pt states first 10 days it got bigger, then stabilized and has since gotten mildly smaller.     Penny Erazo, COT on 6/14/2024 at 7:43 AM            Last edited by Jessy Rae MD on 6/14/2024  8:51 AM.          Review of systems for the eyes was negative other than the pertinent positives/negatives listed in the HPI.    Ocular Meds: maxitrol ophthalmic ointment prn OD    Ocular Hx: RUL chalazion    FOHx: no family history of glaucoma or blindness    PMHx:   Past Medical History:   Diagnosis Date    Basal cell carcinoma     Diverticulosis of large intestine     Squamous cell carcinoma      Assessment & Plan      Kylee Draper is a 63 year old female with the following diagnoses:    MGD of upper and lower lids of both eyes  RUL chalazion  - referred by Dr Maddox  - several weeks of RUL chalazion; treated with erythromycin ophthalmic ointment, and then using maxitrol ophthalmic ointment; has also been doing warm compresses several times a day for 20 min each time and notes mild decrease in size  - reviewed steroid injection vs RUL chalazion I&D vs medical management  - r/b/a RUL chalazion I&D reviewed with patient, patient expressed understanding and would like to proceed; will follow up for RUL chalazion I&D  - in the mean time will continue warm compresses at least BID OU x 5-10 min each time  - continue maxitrol ophthalmic ointment TID OD    Nuclear sclerotic cataract OU  - clinically mild  - observe    Refractive error OU  - may benefit from new refraction in  future    Counseled return/RD precautions    Patient disposition:   Return in about 10 days (around 6/24/2024) for Follow Up [place], RUL chalaxion I&D, sooner changes.    Attending Physician Attestation:  Complete documentation of historical and exam elements from today's encounter can be found in the full encounter summary report (not reduplicated in this progress note).  I personally obtained the chief complaint(s) and history of present illness.  I confirmed and edited as necessary the review of systems, past medical/surgical history, family history, social history, and examination findings as documented by others; and I examined the patient myself.  I personally reviewed the relevant tests, images, and reports as documented above.  I formulated and edited as necessary the assessment and plan and discussed the findings and management plan with the patient and family. . - Jessy Rae MD

## 2024-06-14 NOTE — NURSING NOTE
Chief Complaints and History of Present Illnesses   Patient presents with    Chalazion Evaluation     Pt here for chalazion/stye right eye.      Chief Complaint(s) and History of Present Illness(es)       Chalazion Evaluation              Comments: Pt here for chalazion/stye right eye.               Comments    Pt was set to have I&D with Dr Maddox @[facility], but provider is not in network. Pt has hx of BCC. Pt notes chalazion has been present x3 weeks. Pt states this is her first one.   Pt using warm compresses 3xdaily and using a steroid and erythromycin. Vision is mildly blurred. Pt states first 10 days it got bigger, then stabilized and has since gotten mildly smaller.     Penny Erazo, COT on 6/14/2024 at 7:43 AM

## 2024-07-02 ENCOUNTER — TELEPHONE (OUTPATIENT)
Dept: OPHTHALMOLOGY | Facility: CLINIC | Age: 64
End: 2024-07-02
Payer: COMMERCIAL

## 2024-07-02 NOTE — TELEPHONE ENCOUNTER
Spoke with patient regarding scheduling for July with . Patient stated she already has an appointment scheduled for Friday with her local provider. No further scheduling for this concern needed.-Per Patient

## 2024-10-01 NOTE — TELEPHONE ENCOUNTER
FUTURE VISIT INFORMATION      FUTURE VISIT INFORMATION:  Date: 10/28/24  Time: 2:00pm  Location: [facility]  REFERRAL INFORMATION:  Referring provider:  Fern Meier MBBS    Referring providers clinic: [facility]   Reason for visit/diagnosis  Persistent Right Eye Chalazion     RECORDS REQUESTED FROM:       Clinic name Comments Records Status Imaging Status   eal eye Ov 6/14/24 [facility]  Ov/referral 8/15/24 Care everywhere

## 2024-10-28 ENCOUNTER — PRE VISIT (OUTPATIENT)
Dept: OPHTHALMOLOGY | Facility: CLINIC | Age: 64
End: 2024-10-28

## 2024-12-12 NOTE — TELEPHONE ENCOUNTER
FUTURE VISIT INFORMATION      FUTURE VISIT INFORMATION:  Date: 3/3/25  Time: 1:45pm  Location: [facility]  REFERRAL INFORMATION:  Referring provider:  Fern Meier MBBS    Referring providers clinic: [facility]   Reason for visit/diagnosis  Persistent Right Eye Chalazion      RECORDS REQUESTED FROM:         Clinic name Comments Records Status Imaging Status   eal eye Ov 6/14/24 [facility]  Ov/referral 8/15/24 Care everywhere

## 2025-02-01 ENCOUNTER — HOSPITAL ENCOUNTER (EMERGENCY)
Facility: CLINIC | Age: 65
Discharge: HOME | End: 2025-02-01
Attending: EMERGENCY MEDICINE | Admitting: EMERGENCY MEDICINE
Payer: COMMERCIAL

## 2025-02-01 VITALS
OXYGEN SATURATION: 99 % | TEMPERATURE: 98 F | SYSTOLIC BLOOD PRESSURE: 167 MMHG | HEART RATE: 79 BPM | WEIGHT: 126.98 LBS | BODY MASS INDEX: 18.75 KG/M2 | RESPIRATION RATE: 18 BRPM | DIASTOLIC BLOOD PRESSURE: 99 MMHG

## 2025-02-01 DIAGNOSIS — G47.09 OTHER INSOMNIA: ICD-10-CM

## 2025-02-01 LAB
ALBUMIN SERPL BCG-MCNC: 4.1 G/DL (ref 3.5–5.2)
ALP SERPL-CCNC: 61 U/L (ref 40–150)
ALT SERPL W P-5'-P-CCNC: 25 U/L (ref 0–50)
ANION GAP SERPL CALCULATED.3IONS-SCNC: 11 MMOL/L (ref 7–15)
AST SERPL W P-5'-P-CCNC: 26 U/L (ref 0–45)
BASOPHILS # BLD AUTO: 0 10E3/UL (ref 0–0.2)
BASOPHILS NFR BLD AUTO: 0 %
BILIRUB DIRECT SERPL-MCNC: <0.2 MG/DL (ref 0–0.3)
BILIRUB SERPL-MCNC: 0.3 MG/DL
BUN SERPL-MCNC: 18.1 MG/DL (ref 8–23)
CALCIUM SERPL-MCNC: 8.8 MG/DL (ref 8.8–10.4)
CHLORIDE SERPL-SCNC: 105 MMOL/L (ref 98–107)
CREAT SERPL-MCNC: 0.86 MG/DL (ref 0.51–0.95)
EGFRCR SERPLBLD CKD-EPI 2021: 75 ML/MIN/1.73M2
EOSINOPHIL # BLD AUTO: 0.1 10E3/UL (ref 0–0.7)
EOSINOPHIL NFR BLD AUTO: 1 %
ERYTHROCYTE [DISTWIDTH] IN BLOOD BY AUTOMATED COUNT: 13.2 % (ref 10–15)
GLUCOSE SERPL-MCNC: 133 MG/DL (ref 70–99)
HCO3 SERPL-SCNC: 24 MMOL/L (ref 22–29)
HCT VFR BLD AUTO: 39 % (ref 35–47)
HGB BLD-MCNC: 13.1 G/DL (ref 11.7–15.7)
HOLD SPECIMEN: NORMAL
HOLD SPECIMEN: NORMAL
IMM GRANULOCYTES # BLD: 0 10E3/UL
IMM GRANULOCYTES NFR BLD: 0 %
LYMPHOCYTES # BLD AUTO: 1.9 10E3/UL (ref 0.8–5.3)
LYMPHOCYTES NFR BLD AUTO: 31 %
MAGNESIUM SERPL-MCNC: 2.3 MG/DL (ref 1.7–2.3)
MCH RBC QN AUTO: 31.3 PG (ref 26.5–33)
MCHC RBC AUTO-ENTMCNC: 33.6 G/DL (ref 31.5–36.5)
MCV RBC AUTO: 93 FL (ref 78–100)
MONOCYTES # BLD AUTO: 0.7 10E3/UL (ref 0–1.3)
MONOCYTES NFR BLD AUTO: 12 %
NEUTROPHILS # BLD AUTO: 3.5 10E3/UL (ref 1.6–8.3)
NEUTROPHILS NFR BLD AUTO: 56 %
NRBC # BLD AUTO: 0 10E3/UL
NRBC BLD AUTO-RTO: 0 /100
PLATELET # BLD AUTO: 235 10E3/UL (ref 150–450)
POTASSIUM SERPL-SCNC: 3.7 MMOL/L (ref 3.4–5.3)
PROT SERPL-MCNC: 6.5 G/DL (ref 6.4–8.3)
RBC # BLD AUTO: 4.19 10E6/UL (ref 3.8–5.2)
SODIUM SERPL-SCNC: 140 MMOL/L (ref 135–145)
TSH SERPL DL<=0.005 MIU/L-ACNC: 1.25 UIU/ML (ref 0.3–4.2)
WBC # BLD AUTO: 6.3 10E3/UL (ref 4–11)

## 2025-02-01 PROCEDURE — 82248 BILIRUBIN DIRECT: CPT | Performed by: EMERGENCY MEDICINE

## 2025-02-01 PROCEDURE — 83735 ASSAY OF MAGNESIUM: CPT | Performed by: EMERGENCY MEDICINE

## 2025-02-01 PROCEDURE — 93005 ELECTROCARDIOGRAM TRACING: CPT

## 2025-02-01 PROCEDURE — 99284 EMERGENCY DEPT VISIT MOD MDM: CPT

## 2025-02-01 PROCEDURE — 80048 BASIC METABOLIC PNL TOTAL CA: CPT | Performed by: EMERGENCY MEDICINE

## 2025-02-01 PROCEDURE — 84443 ASSAY THYROID STIM HORMONE: CPT | Performed by: EMERGENCY MEDICINE

## 2025-02-01 PROCEDURE — 84132 ASSAY OF SERUM POTASSIUM: CPT | Performed by: EMERGENCY MEDICINE

## 2025-02-01 PROCEDURE — 85025 COMPLETE CBC W/AUTO DIFF WBC: CPT | Performed by: EMERGENCY MEDICINE

## 2025-02-01 PROCEDURE — 36415 COLL VENOUS BLD VENIPUNCTURE: CPT | Performed by: EMERGENCY MEDICINE

## 2025-02-01 ASSESSMENT — COLUMBIA-SUICIDE SEVERITY RATING SCALE - C-SSRS
1. IN THE PAST MONTH, HAVE YOU WISHED YOU WERE DEAD OR WISHED YOU COULD GO TO SLEEP AND NOT WAKE UP?: NO
2. HAVE YOU ACTUALLY HAD ANY THOUGHTS OF KILLING YOURSELF IN THE PAST MONTH?: NO
6. HAVE YOU EVER DONE ANYTHING, STARTED TO DO ANYTHING, OR PREPARED TO DO ANYTHING TO END YOUR LIFE?: NO

## 2025-02-01 ASSESSMENT — ACTIVITIES OF DAILY LIVING (ADL)
ADLS_ACUITY_SCORE: 52
ADLS_ACUITY_SCORE: 52

## 2025-02-02 NOTE — ED TRIAGE NOTES
Presents to triage with c/o difficulty sleeping and intermittent shakiness. Patient states that about a year ago she had similar symptoms and she was found to have a critically low sodium level which led to an ICU admission. She is concerned that she may have hyponatremia again.

## 2025-02-02 NOTE — ED PROVIDER NOTES
Emergency Department Note      History of Present Illness     Chief Complaint   Shaking and Insomnia      HPI   Kylee Draper is a 64 year old female who presents to the ED for shaking and insomnia. The patient reports 5 nights of insomnia, where she is only getting 2 hours of sleep a night. Also endorses intermittent shakiness. She is concerned as these were similar symptoms to when she was hospitalized last year for low sodium. Patient denies any fever, cough, or shortness of breath CP HA focal neuro sx. She is still eating/drinking fluids as normal and has no changes to bowel/bladder habits. She does note that she has been dealing with GERD for the last year, but states this is getting better and she does not have acid reflux at night.     Independent Historian   None    Review of External Notes   I reviewed 12/2/2023 discharge summary.     Past Medical History     Medical History and Problem List   Past Medical History:   Diagnosis Date    Basal cell carcinoma     Diverticulosis of large intestine     Squamous cell carcinoma        Medications   ABRYSVO injection  amLODIPine (NORVASC) 5 MG tablet  erythromycin (ROMYCIN) 5 MG/GM ophthalmic ointment  esomeprazole (NEXIUM) 40 MG DR capsule  estradiol (ESTRACE) 0.1 MG/GM vaginal cream  fluocinolone acetonide oil 0.01 % ear drops  neomycin-polymyxin-dexAMETHasone (MAXITROL) 3.5-28281-6.1 ophthalmic ointment  omeprazole (PRILOSEC) 20 MG DR capsule  progesterone (PROMETRIUM) 100 MG capsule  propranolol (INDERAL) 10 MG tablet  traZODone (DESYREL) 50 MG tablet        Surgical History   Past Surgical History:   Procedure Laterality Date    BIOPSY OF SKIN LESION      CHOLECYSTECTOMY      COLONOSCOPY      MOHS MICROGRAPHIC PROCEDURE         Physical Exam     Patient Vitals for the past 24 hrs:   BP Temp Pulse Resp SpO2 Weight   02/01/25 2101 (!) 167/99 98  F (36.7  C) 79 18 99 % 57.6 kg (126 lb 15.8 oz)     Physical Exam  General: Patient is well appearing. No  distress.  Head: Atraumatic.  Eyes: Conjunctivae and EOM are normal. No scleral icterus.  Neck: Normal range of motion. Neck supple.   Cardiovascular: Normal rate, regular rhythm, normal heart sounds and intact distal pulses.   Pulmonary/Chest: Breath sounds normal. No respiratory distress.  Abdominal: Soft. Bowel sounds are normal. No distension. No tenderness. No rebound or guarding.   Musculoskeletal: Normal range of motion.  Skin: Warm and dry. No rash noted. Not diaphoretic.       Diagnostics     Lab Results   Labs Ordered and Resulted from Time of ED Arrival to Time of ED Departure   BASIC METABOLIC PANEL - Abnormal       Result Value    Sodium 140      Potassium 3.7      Chloride 105      Carbon Dioxide (CO2) 24      Anion Gap 11      Urea Nitrogen 18.1      Creatinine 0.86      GFR Estimate 75      Calcium 8.8      Glucose 133 (*)    HEPATIC FUNCTION PANEL - Normal    Protein Total 6.5      Albumin 4.1      Bilirubin Total 0.3      Alkaline Phosphatase 61      AST 26      ALT 25      Bilirubin Direct <0.20     MAGNESIUM - Normal    Magnesium 2.3     TSH WITH FREE T4 REFLEX - Normal    TSH 1.25     CBC WITH PLATELETS AND DIFFERENTIAL    WBC Count 6.3      RBC Count 4.19      Hemoglobin 13.1      Hematocrit 39.0      MCV 93      MCH 31.3      MCHC 33.6      RDW 13.2      Platelet Count 235      % Neutrophils 56      % Lymphocytes 31      % Monocytes 12      % Eosinophils 1      % Basophils 0      % Immature Granulocytes 0      NRBCs per 100 WBC 0      Absolute Neutrophils 3.5      Absolute Lymphocytes 1.9      Absolute Monocytes 0.7      Absolute Eosinophils 0.1      Absolute Basophils 0.0      Absolute Immature Granulocytes 0.0      Absolute NRBCs 0.0         Imaging   No orders to display       EKG   ECG taken at 2118, ECG read at 2119  Normal sinus rhythm  Septal infarct, age undetermined  Abnormal ECG   Rate 67 bpm. MO interval 158 ms. QRS duration 76 ms. QT/QTc 386/407 ms. P-R-T axes 84 71  63.    Independent Interpretation   None    ED Course      Medications Administered   Medications - No data to display    Procedures   Procedures     Discussion of Management       ED Course   ED Course as of 02/01/25 2309   Sat Feb 01, 2025 2116 I obtained history and examined the patient as noted above.         Additional Documentation  None    Medical Decision Making / Diagnosis       ANNY Draper is a 64 year old female with vague insomnia sx without focal psychosocial reason relayed when asking.  Additionally large metabolic infectious and electrolyte workup as above no acute abnl very reassuring.  Pt is very nervous and worried and reassurance relievs her worries.  No other acute red flag sx to drive workup or diagnosis.  Rec PCP follow up and strict return instructions.        Disposition   The patient was discharged.     Diagnosis     ICD-10-CM    1. Other insomnia  G47.09            Discharge Medications   New Prescriptions    No medications on file         Scribe Disclosure:  I, Aliza Vidales, am serving as a scribe at 9:17 PM on 2/1/2025 to document services personally performed by Damir Fisher MD based on my observations and the provider's statements to me.        Damir Fisher MD  02/01/25 1845

## 2025-02-03 LAB
ATRIAL RATE - MUSE: 67 BPM
DIASTOLIC BLOOD PRESSURE - MUSE: NORMAL MMHG
INTERPRETATION ECG - MUSE: NORMAL
P AXIS - MUSE: 84 DEGREES
PR INTERVAL - MUSE: 158 MS
QRS DURATION - MUSE: 76 MS
QT - MUSE: 386 MS
QTC - MUSE: 407 MS
R AXIS - MUSE: 71 DEGREES
SYSTOLIC BLOOD PRESSURE - MUSE: NORMAL MMHG
T AXIS - MUSE: 63 DEGREES
VENTRICULAR RATE- MUSE: 67 BPM

## 2025-03-03 ENCOUNTER — PRE VISIT (OUTPATIENT)
Dept: OPHTHALMOLOGY | Facility: CLINIC | Age: 65
End: 2025-03-03